# Patient Record
Sex: FEMALE | Race: NATIVE HAWAIIAN OR OTHER PACIFIC ISLANDER | Employment: UNEMPLOYED | URBAN - METROPOLITAN AREA
[De-identification: names, ages, dates, MRNs, and addresses within clinical notes are randomized per-mention and may not be internally consistent; named-entity substitution may affect disease eponyms.]

---

## 2022-08-08 LAB
AMB EXT MYRIAD FORESIGHT: NEGATIVE
AMB EXT MYRIAD TRISOMY 13: NEGATIVE
AMB EXT MYRIAD TRISOMY 18: NEGATIVE
AMB EXT MYRIAD TRISOMY 21: NEGATIVE

## 2022-12-01 NOTE — PROGRESS NOTES
8/4/22  Ultrasound  Single live IUP  BAR=860 bpm  EGA=10w6d     10/13/22  MFM Level 2    Thank you for asking me to meet with your patient in consultation and for ultrasound. I met with her on 10/13/22. As you know, Adenike Galindo is a 30YO year-old who is currently 23w11d pregnant with an Estimated Date of Delivery: 2/24/23. Adenike Galindo is seen due to a family history of cardiac defects/cardiac disease. Adenike Galindo has an obstetric history significant for no prior pregnancies. Adenike Galindo has a past medical history significant for high cholesterol in [facility] and had been on Lipitor until early pregnancy. She reports that she has had an EKG in the past, but not recently. She denies a history of HTN or diabetes outside of pregnancy. She also has a history of anxiety, but describes that it is mostly exacerbated by caffeine at this time. She has never taken medication or had a therapist.     Adenike Galindo has a past surgical history significant for breast implants in 6605 without complications. Adenike Galindo denies smoking, alcohol and recreational drugs during this pregnancy. The FOB has a brother with a VSD and a sister with a pacemaker (?unclear reason). The patient has multiple family members (including herself) with high cholesterol. She also described her Aunt with hypertrophic cardiomyopathy. This pregnancy has been significant for cell free DNA which was low risk. The ultrasound today reveals a keane fetus with size consistent with dates and an AGA pattern of growth. The fetal cardiac anatomy appeared normal, including 4 chamber, long axis, short axis, aortic arch and great vessel views. The remaining fetal anatomy also appeared normal. Additional ultrasound abnormalities commonly observed in association with fetal aneuploidy were not identified. I discussed with the patient the findings of today's ultrasound.     Vitals Recorded in This Encounter     10/13/2022  1438         Weight: 70.3 kg (155 lb)   Height: 5' 3\" (1.6 m)   BMI: 27.46 kg/m2           Findings: 1. Intrauterine pregnancy at 20w6d (Estimated Date of Delivery: 2/24/23)  2. Normal fetal anatomic assessment ultrasound  3. Low risk cell free DNA  4. Family history of congenital cardiac defects  5. Family history of cardiac disease    I discussed with the patient the normal findings of today's ultrasound. We discussed the sensitivity of serum screening and fetal anatomic assessment ultrasound. We also discussed the limitations of ultrasound for documenting both genetic and nongenetic congenital anomalies. We further discussed that there is a family history of 2nd degree relative with a congenital cardiac defect. The cardiac evaluation is normal. The risk is therefore not increased for this fetus, but a heart defect can not be ruled out with 077% certainty. The patient has a history of high cholesterol, a strong family history of the same, and a family history of cardiac disease. I would recommend an EKG and maternal echocardiogram given no recent evaluation and her family history. If normal, no further evaluation or monitoring will be required. In summary, I make the following recommendations:  1. Continued COVID precaution and vaccine counseling, TDAP, and flu vaccine  2. Maternal EKG  3. Maternal echocardiogram  4. Avoid caffeine due to associated anxiety symptoms  5. Close mood monitoring in the setting of anxiety in pregnancy    Thanks again for referring Tamie Hurley to Maternal Fetal Medicine. Please do not hesitate to contact me if I may be of further assistance of if you should have any questions. Sincerely,     Karolina Meade MD    Please note that I spent 30 minutes total time, including total time spent in counseling and coordination of care for services on the items detailed above. All or part of this dictation was completed with Dragon voice recognition software. As such, there may be uncorrected typographical errors that occur.  Please consider this information when reading or reviewing this document. The OUD checklist was reviewed with [facility] and she screened that she IS NOT at risk for opioid use disorder. Based on her screening results she was not offered  Family Support. Note to patient: The [facility] makes medical notes like these available to patients in the interest of transparency. However, be advised this is a medical document. It is intended as peer to peer communication. It is written in medical language and may contain abbreviations or verbiage that are unfamiliar. It may appear blunt or direct. Medical documents are intended to carry relevant information, facts as evident, and the clinical opinion of the practitioner.    Electronically signed by Carmelo Rene MD at 10/13/2022 2:50 PM CDT

## 2022-12-06 ENCOUNTER — INITIAL PRENATAL (OUTPATIENT)
Facility: CLINIC | Age: 32
End: 2022-12-06
Payer: COMMERCIAL

## 2022-12-06 VITALS
HEIGHT: 63 IN | BODY MASS INDEX: 28 KG/M2 | DIASTOLIC BLOOD PRESSURE: 73 MMHG | HEART RATE: 82 BPM | WEIGHT: 158 LBS | SYSTOLIC BLOOD PRESSURE: 118 MMHG

## 2022-12-06 DIAGNOSIS — Z3A.28 28 WEEKS GESTATION OF PREGNANCY: ICD-10-CM

## 2022-12-06 DIAGNOSIS — Z23 NEED FOR VACCINATION: ICD-10-CM

## 2022-12-06 DIAGNOSIS — Z34.03 ENCOUNTER FOR SUPERVISION OF NORMAL FIRST PREGNANCY IN THIRD TRIMESTER: Primary | ICD-10-CM

## 2022-12-06 LAB
BILIRUBIN: NEGATIVE
GLUCOSE (URINE DIPSTICK): NEGATIVE MG/DL
KETONES (URINE DIPSTICK): NEGATIVE MG/DL
LEUKOCYTES: NEGATIVE
MULTISTIX LOT#: NORMAL NUMERIC
NITRITE, URINE: NEGATIVE
OCCULT BLOOD: NEGATIVE
PH, URINE: 7 (ref 4.5–8)
PROTEIN (URINE DIPSTICK): NEGATIVE MG/DL
SPECIFIC GRAVITY: 1.01 (ref 1–1.03)
UROBILINOGEN,SEMI-QN: 0.2 MG/DL (ref 0–1.9)

## 2022-12-06 PROCEDURE — 3078F DIAST BP <80 MM HG: CPT | Performed by: OBSTETRICS & GYNECOLOGY

## 2022-12-06 PROCEDURE — 81002 URINALYSIS NONAUTO W/O SCOPE: CPT | Performed by: OBSTETRICS & GYNECOLOGY

## 2022-12-06 PROCEDURE — 3008F BODY MASS INDEX DOCD: CPT | Performed by: OBSTETRICS & GYNECOLOGY

## 2022-12-06 PROCEDURE — 90715 TDAP VACCINE 7 YRS/> IM: CPT | Performed by: OBSTETRICS & GYNECOLOGY

## 2022-12-06 PROCEDURE — 3074F SYST BP LT 130 MM HG: CPT | Performed by: OBSTETRICS & GYNECOLOGY

## 2022-12-06 PROCEDURE — 90471 IMMUNIZATION ADMIN: CPT | Performed by: OBSTETRICS & GYNECOLOGY

## 2022-12-06 RX ORDER — CHOLECALCIFEROL (VITAMIN D3) 25 MCG
1 TABLET,CHEWABLE ORAL DAILY
COMMUNITY

## 2022-12-06 NOTE — PROGRESS NOTES
Transfer OB 28w4d  - denies h/o HSV, blood transfusion, hepatitis  - PGF and MGM had h/o HTN, patient had normal EKG , no h/o congenital heart defect in the family  - cfDNA neg BOY per patient  - normal L2U  - TDAP today, HIV and CBC ordered

## 2022-12-08 ENCOUNTER — TELEPHONE (OUTPATIENT)
Facility: CLINIC | Age: 32
End: 2022-12-08

## 2022-12-10 ENCOUNTER — LABORATORY ENCOUNTER (OUTPATIENT)
Dept: LAB | Age: 32
End: 2022-12-10
Attending: OBSTETRICS & GYNECOLOGY
Payer: COMMERCIAL

## 2022-12-10 DIAGNOSIS — Z34.03 ENCOUNTER FOR SUPERVISION OF NORMAL FIRST PREGNANCY IN THIRD TRIMESTER: ICD-10-CM

## 2022-12-10 LAB
BASOPHILS # BLD AUTO: 0.03 X10(3) UL (ref 0–0.2)
BASOPHILS NFR BLD AUTO: 0.4 %
EOSINOPHIL # BLD AUTO: 0.05 X10(3) UL (ref 0–0.7)
EOSINOPHIL NFR BLD AUTO: 0.7 %
ERYTHROCYTE [DISTWIDTH] IN BLOOD BY AUTOMATED COUNT: 13 %
HCT VFR BLD AUTO: 39.1 %
HGB BLD-MCNC: 13 G/DL
IMM GRANULOCYTES # BLD AUTO: 0.06 X10(3) UL (ref 0–1)
IMM GRANULOCYTES NFR BLD: 0.8 %
LYMPHOCYTES # BLD AUTO: 1.92 X10(3) UL (ref 1–4)
LYMPHOCYTES NFR BLD AUTO: 25.8 %
MCH RBC QN AUTO: 31.6 PG (ref 26–34)
MCHC RBC AUTO-ENTMCNC: 33.2 G/DL (ref 31–37)
MCV RBC AUTO: 95.1 FL
MONOCYTES # BLD AUTO: 0.31 X10(3) UL (ref 0.1–1)
MONOCYTES NFR BLD AUTO: 4.2 %
NEUTROPHILS # BLD AUTO: 5.07 X10 (3) UL (ref 1.5–7.7)
NEUTROPHILS # BLD AUTO: 5.07 X10(3) UL (ref 1.5–7.7)
NEUTROPHILS NFR BLD AUTO: 68.1 %
PLATELET # BLD AUTO: 244 10(3)UL (ref 150–450)
RBC # BLD AUTO: 4.11 X10(6)UL
WBC # BLD AUTO: 7.4 X10(3) UL (ref 4–11)

## 2022-12-10 PROCEDURE — 85025 COMPLETE CBC W/AUTO DIFF WBC: CPT | Performed by: OBSTETRICS & GYNECOLOGY

## 2022-12-10 PROCEDURE — 87389 HIV-1 AG W/HIV-1&-2 AB AG IA: CPT | Performed by: OBSTETRICS & GYNECOLOGY

## 2022-12-22 ENCOUNTER — ROUTINE PRENATAL (OUTPATIENT)
Facility: CLINIC | Age: 32
End: 2022-12-22
Payer: COMMERCIAL

## 2022-12-22 VITALS
HEIGHT: 63 IN | HEART RATE: 87 BPM | WEIGHT: 162 LBS | DIASTOLIC BLOOD PRESSURE: 70 MMHG | BODY MASS INDEX: 28.7 KG/M2 | SYSTOLIC BLOOD PRESSURE: 118 MMHG

## 2022-12-22 DIAGNOSIS — Z34.83 PRENATAL CARE, SUBSEQUENT PREGNANCY, THIRD TRIMESTER: Primary | ICD-10-CM

## 2022-12-22 LAB
GLUCOSE (URINE DIPSTICK): NEGATIVE MG/DL
MULTISTIX LOT#: NORMAL NUMERIC
PROTEIN (URINE DIPSTICK): NEGATIVE MG/DL

## 2022-12-22 PROCEDURE — 81002 URINALYSIS NONAUTO W/O SCOPE: CPT | Performed by: OBSTETRICS & GYNECOLOGY

## 2022-12-22 PROCEDURE — 3008F BODY MASS INDEX DOCD: CPT | Performed by: OBSTETRICS & GYNECOLOGY

## 2022-12-22 PROCEDURE — 3078F DIAST BP <80 MM HG: CPT | Performed by: OBSTETRICS & GYNECOLOGY

## 2022-12-22 PROCEDURE — 3074F SYST BP LT 130 MM HG: CPT | Performed by: OBSTETRICS & GYNECOLOGY

## 2022-12-22 NOTE — PROGRESS NOTES
Breast pump information given. Flu shot is done. They desire circumcision. Some numbness in her hands.

## 2023-01-05 ENCOUNTER — ROUTINE PRENATAL (OUTPATIENT)
Facility: CLINIC | Age: 33
End: 2023-01-05
Payer: COMMERCIAL

## 2023-01-05 VITALS
HEIGHT: 63 IN | WEIGHT: 164.38 LBS | DIASTOLIC BLOOD PRESSURE: 68 MMHG | BODY MASS INDEX: 29.12 KG/M2 | SYSTOLIC BLOOD PRESSURE: 120 MMHG

## 2023-01-05 DIAGNOSIS — Z34.83 PRENATAL CARE, SUBSEQUENT PREGNANCY, THIRD TRIMESTER: Primary | ICD-10-CM

## 2023-01-05 PROCEDURE — 3074F SYST BP LT 130 MM HG: CPT

## 2023-01-05 PROCEDURE — 81002 URINALYSIS NONAUTO W/O SCOPE: CPT

## 2023-01-05 PROCEDURE — 3008F BODY MASS INDEX DOCD: CPT

## 2023-01-05 PROCEDURE — 3078F DIAST BP <80 MM HG: CPT

## 2023-01-05 NOTE — PROGRESS NOTES
MICHELLE 32w6d    Pt doing well, no complaints       Transfer OB 28w4d  -- PGF and MGM had h/o HTN, patient had normal EKG , no h/o congenital heart defect in the family  - cfDNA neg BOY per patient  - normal L2US  - TDAP today, HIV and CBC normal    RTC 2 wk

## 2023-01-19 PROBLEM — O35.2XX0: Status: ACTIVE | Noted: 2022-10-13

## 2023-01-19 PROBLEM — O09.90 SUPERVISION OF HIGH RISK PREGNANCY, ANTEPARTUM: Status: ACTIVE | Noted: 2022-10-20

## 2023-01-19 PROBLEM — R93.89 ABNORMAL ULTRASOUND: Status: ACTIVE | Noted: 2022-05-05

## 2023-01-19 PROBLEM — Z86.16 HISTORY OF COVID-19: Status: ACTIVE | Noted: 2023-01-19

## 2023-01-19 PROBLEM — O09.90 SUPERVISION OF HIGH RISK PREGNANCY, ANTEPARTUM (HCC): Status: ACTIVE | Noted: 2022-10-20

## 2023-01-19 PROBLEM — F41.9 ANXIETY DISORDER AFFECTING PREGNANCY, ANTEPARTUM (HCC): Status: ACTIVE | Noted: 2023-01-19

## 2023-01-19 PROBLEM — F41.9 ANXIETY DISORDER AFFECTING PREGNANCY, ANTEPARTUM: Status: ACTIVE | Noted: 2023-01-19

## 2023-01-19 PROBLEM — F41.9 ANXIETY: Status: ACTIVE | Noted: 2022-10-20

## 2023-01-19 PROBLEM — Z34.93 PREGNANCY WITH PRENATAL CARE ELSEWHERE IN THIRD TRIMESTER (HCC): Status: ACTIVE | Noted: 2023-01-19

## 2023-01-19 PROBLEM — O99.340 ANXIETY DISORDER AFFECTING PREGNANCY, ANTEPARTUM: Status: ACTIVE | Noted: 2023-01-19

## 2023-01-19 PROBLEM — Z34.83 PRENATAL CARE, SUBSEQUENT PREGNANCY IN THIRD TRIMESTER (HCC): Status: ACTIVE | Noted: 2023-01-19

## 2023-01-19 PROBLEM — D21.9 FIBROIDS: Status: ACTIVE | Noted: 2022-05-05

## 2023-01-19 PROBLEM — Z34.93 PREGNANCY WITH PRENATAL CARE ELSEWHERE IN THIRD TRIMESTER: Status: ACTIVE | Noted: 2023-01-19

## 2023-01-19 PROBLEM — Z82.79 FAMILY HISTORY OF CONGENITAL HEART DISEASE: Status: ACTIVE | Noted: 2023-01-19

## 2023-01-19 PROBLEM — Z34.83 PRENATAL CARE, SUBSEQUENT PREGNANCY IN THIRD TRIMESTER: Status: ACTIVE | Noted: 2023-01-19

## 2023-01-19 PROBLEM — O99.340 ANXIETY DISORDER AFFECTING PREGNANCY, ANTEPARTUM (HCC): Status: ACTIVE | Noted: 2023-01-19

## 2023-01-20 ENCOUNTER — ULTRASOUND ENCOUNTER (OUTPATIENT)
Facility: CLINIC | Age: 33
End: 2023-01-20
Payer: COMMERCIAL

## 2023-01-20 PROBLEM — O26.899 CARPAL TUNNEL SYNDROME DURING PREGNANCY: Status: ACTIVE | Noted: 2023-01-20

## 2023-01-20 PROBLEM — O26.899 CARPAL TUNNEL SYNDROME DURING PREGNANCY (HCC): Status: ACTIVE | Noted: 2023-01-20

## 2023-01-20 PROBLEM — G56.00 CARPAL TUNNEL SYNDROME DURING PREGNANCY (HCC): Status: ACTIVE | Noted: 2023-01-20

## 2023-01-20 PROBLEM — G56.00 CARPAL TUNNEL SYNDROME DURING PREGNANCY: Status: ACTIVE | Noted: 2023-01-20

## 2023-01-20 PROBLEM — O28.8 AFI (AMNIOTIC FLUID INDEX) BORDERLINE LOW: Status: ACTIVE | Noted: 2023-01-20

## 2023-01-23 ENCOUNTER — TELEPHONE (OUTPATIENT)
Dept: OBGYN CLINIC | Facility: CLINIC | Age: 33
End: 2023-01-23

## 2023-01-23 DIAGNOSIS — O28.8 AFI (AMNIOTIC FLUID INDEX) BORDERLINE LOW: Primary | ICD-10-CM

## 2023-01-26 ENCOUNTER — ULTRASOUND ENCOUNTER (OUTPATIENT)
Facility: CLINIC | Age: 33
End: 2023-01-26
Payer: COMMERCIAL

## 2023-01-27 ENCOUNTER — TELEPHONE (OUTPATIENT)
Facility: CLINIC | Age: 33
End: 2023-01-27

## 2023-01-27 NOTE — TELEPHONE ENCOUNTER
Breast Pump order was received from [employer] [facility]. Order form was faxed in with doctors signature.

## 2023-02-02 ENCOUNTER — ROUTINE PRENATAL (OUTPATIENT)
Facility: CLINIC | Age: 33
End: 2023-02-02
Payer: COMMERCIAL

## 2023-02-02 VITALS
HEIGHT: 63 IN | DIASTOLIC BLOOD PRESSURE: 70 MMHG | HEART RATE: 104 BPM | SYSTOLIC BLOOD PRESSURE: 126 MMHG | BODY MASS INDEX: 29.42 KG/M2 | WEIGHT: 166.06 LBS

## 2023-02-02 DIAGNOSIS — Z3A.36 36 WEEKS GESTATION OF PREGNANCY: ICD-10-CM

## 2023-02-02 DIAGNOSIS — N89.8 VAGINAL ITCHING: ICD-10-CM

## 2023-02-02 DIAGNOSIS — Z34.83 PRENATAL CARE, SUBSEQUENT PREGNANCY IN THIRD TRIMESTER: Primary | ICD-10-CM

## 2023-02-02 PROCEDURE — 87510 GARDNER VAG DNA DIR PROBE: CPT

## 2023-02-02 PROCEDURE — 87480 CANDIDA DNA DIR PROBE: CPT

## 2023-02-02 PROCEDURE — 87660 TRICHOMONAS VAGIN DIR PROBE: CPT

## 2023-02-02 PROCEDURE — 87653 STREP B DNA AMP PROBE: CPT

## 2023-02-02 PROCEDURE — 87081 CULTURE SCREEN ONLY: CPT

## 2023-02-04 LAB — GROUP B STREP BY PCR FOR PCR OVT: NEGATIVE

## 2023-02-09 ENCOUNTER — ROUTINE PRENATAL (OUTPATIENT)
Facility: CLINIC | Age: 33
End: 2023-02-09
Payer: COMMERCIAL

## 2023-02-09 VITALS
WEIGHT: 170 LBS | SYSTOLIC BLOOD PRESSURE: 120 MMHG | BODY MASS INDEX: 30.12 KG/M2 | HEART RATE: 77 BPM | HEIGHT: 63 IN | DIASTOLIC BLOOD PRESSURE: 70 MMHG

## 2023-02-09 DIAGNOSIS — O28.8 AFI (AMNIOTIC FLUID INDEX) BORDERLINE LOW: ICD-10-CM

## 2023-02-09 DIAGNOSIS — Z34.83 PRENATAL CARE, SUBSEQUENT PREGNANCY, THIRD TRIMESTER: Primary | ICD-10-CM

## 2023-02-09 LAB
GLUCOSE (URINE DIPSTICK): NEGATIVE MG/DL
MULTISTIX EXPIRATION DATE: NORMAL DATE
MULTISTIX LOT#: NORMAL NUMERIC
PROTEIN (URINE DIPSTICK): NEGATIVE MG/DL

## 2023-02-09 PROCEDURE — 59025 FETAL NON-STRESS TEST: CPT

## 2023-02-09 PROCEDURE — 3008F BODY MASS INDEX DOCD: CPT

## 2023-02-09 PROCEDURE — 3074F SYST BP LT 130 MM HG: CPT

## 2023-02-09 PROCEDURE — 81002 URINALYSIS NONAUTO W/O SCOPE: CPT

## 2023-02-09 PROCEDURE — 3078F DIAST BP <80 MM HG: CPT

## 2023-02-16 ENCOUNTER — ROUTINE PRENATAL (OUTPATIENT)
Facility: CLINIC | Age: 33
End: 2023-02-16
Payer: COMMERCIAL

## 2023-02-16 VITALS
WEIGHT: 170.63 LBS | HEIGHT: 63 IN | HEART RATE: 78 BPM | DIASTOLIC BLOOD PRESSURE: 70 MMHG | SYSTOLIC BLOOD PRESSURE: 122 MMHG | BODY MASS INDEX: 30.23 KG/M2

## 2023-02-16 DIAGNOSIS — Z34.93 ENCOUNTER FOR SUPERVISION OF NORMAL PREGNANCY IN THIRD TRIMESTER, UNSPECIFIED GRAVIDITY: Primary | ICD-10-CM

## 2023-02-16 DIAGNOSIS — Z3A.38 38 WEEKS GESTATION OF PREGNANCY: ICD-10-CM

## 2023-02-16 DIAGNOSIS — O28.8 AFI (AMNIOTIC FLUID INDEX) BORDERLINE LOW: ICD-10-CM

## 2023-02-16 PROCEDURE — 3078F DIAST BP <80 MM HG: CPT | Performed by: OBSTETRICS & GYNECOLOGY

## 2023-02-16 PROCEDURE — 3074F SYST BP LT 130 MM HG: CPT | Performed by: OBSTETRICS & GYNECOLOGY

## 2023-02-16 PROCEDURE — 81002 URINALYSIS NONAUTO W/O SCOPE: CPT | Performed by: OBSTETRICS & GYNECOLOGY

## 2023-02-16 PROCEDURE — 3008F BODY MASS INDEX DOCD: CPT | Performed by: OBSTETRICS & GYNECOLOGY

## 2023-02-16 PROCEDURE — 59426 ANTEPARTUM CARE ONLY: CPT | Performed by: OBSTETRICS & GYNECOLOGY

## 2023-02-17 ENCOUNTER — HOSPITAL ENCOUNTER (INPATIENT)
Facility: HOSPITAL | Age: 33
LOS: 3 days | Discharge: HOME | End: 2023-02-20
Attending: OBSTETRICS & GYNECOLOGY | Admitting: OBSTETRICS & GYNECOLOGY
Payer: COMMERCIAL

## 2023-02-17 ENCOUNTER — APPOINTMENT (OUTPATIENT)
Dept: OBGYN CLINIC | Facility: HOSPITAL | Age: 33
End: 2023-02-17
Payer: COMMERCIAL

## 2023-02-17 ENCOUNTER — ULTRASOUND ENCOUNTER (OUTPATIENT)
Facility: CLINIC | Age: 33
End: 2023-02-17
Payer: COMMERCIAL

## 2023-02-17 PROBLEM — Z34.90 PREGNANCY (HCC): Status: ACTIVE | Noted: 2023-02-17

## 2023-02-17 PROBLEM — Z34.90 PREGNANCY: Status: ACTIVE | Noted: 2023-02-17

## 2023-02-17 LAB
ANTIBODY SCREEN: NEGATIVE
BASOPHILS # BLD AUTO: 0.02 X10(3) UL (ref 0–0.2)
BASOPHILS NFR BLD AUTO: 0.3 %
EOSINOPHIL # BLD AUTO: 0.03 X10(3) UL (ref 0–0.7)
EOSINOPHIL NFR BLD AUTO: 0.4 %
ERYTHROCYTE [DISTWIDTH] IN BLOOD BY AUTOMATED COUNT: 12.1 %
HCT VFR BLD AUTO: 38 %
HGB BLD-MCNC: 12.9 G/DL
IMM GRANULOCYTES # BLD AUTO: 0.02 X10(3) UL (ref 0–1)
IMM GRANULOCYTES NFR BLD: 0.3 %
LYMPHOCYTES # BLD AUTO: 2.09 X10(3) UL (ref 1–4)
LYMPHOCYTES NFR BLD AUTO: 26.7 %
MCH RBC QN AUTO: 31.6 PG (ref 26–34)
MCHC RBC AUTO-ENTMCNC: 33.9 G/DL (ref 31–37)
MCV RBC AUTO: 93.1 FL
MONOCYTES # BLD AUTO: 0.3 X10(3) UL (ref 0.1–1)
MONOCYTES NFR BLD AUTO: 3.8 %
NEUTROPHILS # BLD AUTO: 5.38 X10 (3) UL (ref 1.5–7.7)
NEUTROPHILS # BLD AUTO: 5.38 X10(3) UL (ref 1.5–7.7)
NEUTROPHILS NFR BLD AUTO: 68.5 %
PLATELET # BLD AUTO: 212 10(3)UL (ref 150–450)
RBC # BLD AUTO: 4.08 X10(6)UL
RH BLOOD TYPE: POSITIVE
SARS-COV-2 RNA RESP QL NAA+PROBE: NOT DETECTED
T PALLIDUM AB SER QL IA: NONREACTIVE
WBC # BLD AUTO: 7.8 X10(3) UL (ref 4–11)

## 2023-02-17 PROCEDURE — 3E0P7VZ INTRODUCTION OF HORMONE INTO FEMALE REPRODUCTIVE, VIA NATURAL OR ARTIFICIAL OPENING: ICD-10-PCS | Performed by: OBSTETRICS & GYNECOLOGY

## 2023-02-17 RX ORDER — DEXTROSE, SODIUM CHLORIDE, SODIUM LACTATE, POTASSIUM CHLORIDE, AND CALCIUM CHLORIDE 5; .6; .31; .03; .02 G/100ML; G/100ML; G/100ML; G/100ML; G/100ML
INJECTION, SOLUTION INTRAVENOUS AS NEEDED
Status: DISCONTINUED | OUTPATIENT
Start: 2023-02-17 | End: 2023-02-18 | Stop reason: HOSPADM

## 2023-02-17 RX ORDER — SODIUM CHLORIDE, SODIUM LACTATE, POTASSIUM CHLORIDE, CALCIUM CHLORIDE 600; 310; 30; 20 MG/100ML; MG/100ML; MG/100ML; MG/100ML
INJECTION, SOLUTION INTRAVENOUS CONTINUOUS
Status: DISCONTINUED | OUTPATIENT
Start: 2023-02-17 | End: 2023-02-18 | Stop reason: HOSPADM

## 2023-02-17 RX ORDER — NALBUPHINE HCL 10 MG/ML
2.5 AMPUL (ML) INJECTION
Status: DISCONTINUED | OUTPATIENT
Start: 2023-02-17 | End: 2023-02-18

## 2023-02-17 RX ORDER — TERBUTALINE SULFATE 1 MG/ML
0.25 INJECTION, SOLUTION SUBCUTANEOUS AS NEEDED
Status: DISCONTINUED | OUTPATIENT
Start: 2023-02-17 | End: 2023-02-18 | Stop reason: HOSPADM

## 2023-02-17 RX ORDER — HYDROMORPHONE HYDROCHLORIDE 1 MG/ML
1 INJECTION, SOLUTION INTRAMUSCULAR; INTRAVENOUS; SUBCUTANEOUS ONCE
Status: COMPLETED | OUTPATIENT
Start: 2023-02-17 | End: 2023-02-18

## 2023-02-17 RX ORDER — TRISODIUM CITRATE DIHYDRATE AND CITRIC ACID MONOHYDRATE 500; 334 MG/5ML; MG/5ML
30 SOLUTION ORAL AS NEEDED
Status: DISCONTINUED | OUTPATIENT
Start: 2023-02-17 | End: 2023-02-18 | Stop reason: HOSPADM

## 2023-02-17 RX ORDER — ACETAMINOPHEN 500 MG
500 TABLET ORAL EVERY 6 HOURS PRN
Status: DISCONTINUED | OUTPATIENT
Start: 2023-02-17 | End: 2023-02-18

## 2023-02-17 RX ORDER — BUPIVACAINE HCL/0.9 % NACL/PF 0.25 %
5 PLASTIC BAG, INJECTION (ML) EPIDURAL AS NEEDED
Status: DISCONTINUED | OUTPATIENT
Start: 2023-02-17 | End: 2023-02-18

## 2023-02-17 RX ORDER — AMMONIA INHALANTS 0.04 G/.3ML
0.3 INHALANT RESPIRATORY (INHALATION) AS NEEDED
Status: DISCONTINUED | OUTPATIENT
Start: 2023-02-17 | End: 2023-02-18 | Stop reason: HOSPADM

## 2023-02-17 RX ORDER — IBUPROFEN 600 MG/1
600 TABLET ORAL EVERY 6 HOURS PRN
Status: DISCONTINUED | OUTPATIENT
Start: 2023-02-17 | End: 2023-02-18 | Stop reason: HOSPADM

## 2023-02-17 RX ORDER — ONDANSETRON 2 MG/ML
4 INJECTION INTRAMUSCULAR; INTRAVENOUS EVERY 6 HOURS PRN
Status: DISCONTINUED | OUTPATIENT
Start: 2023-02-17 | End: 2023-02-18 | Stop reason: HOSPADM

## 2023-02-18 ENCOUNTER — ANESTHESIA EVENT (OUTPATIENT)
Dept: OBGYN UNIT | Facility: HOSPITAL | Age: 33
End: 2023-02-18
Payer: COMMERCIAL

## 2023-02-18 ENCOUNTER — ANESTHESIA (OUTPATIENT)
Dept: OBGYN UNIT | Facility: HOSPITAL | Age: 33
End: 2023-02-18
Payer: COMMERCIAL

## 2023-02-18 PROCEDURE — 3E033VJ INTRODUCTION OF OTHER HORMONE INTO PERIPHERAL VEIN, PERCUTANEOUS APPROACH: ICD-10-PCS | Performed by: OBSTETRICS & GYNECOLOGY

## 2023-02-18 PROCEDURE — 59410 OBSTETRICAL CARE: CPT | Performed by: OBSTETRICS & GYNECOLOGY

## 2023-02-18 PROCEDURE — 0KQM0ZZ REPAIR PERINEUM MUSCLE, OPEN APPROACH: ICD-10-PCS | Performed by: OBSTETRICS & GYNECOLOGY

## 2023-02-18 RX ORDER — IBUPROFEN 600 MG/1
600 TABLET ORAL EVERY 6 HOURS
Status: DISCONTINUED | OUTPATIENT
Start: 2023-02-18 | End: 2023-02-20

## 2023-02-18 RX ORDER — LIDOCAINE HYDROCHLORIDE AND EPINEPHRINE 15; 5 MG/ML; UG/ML
INJECTION, SOLUTION EPIDURAL AS NEEDED
Status: DISCONTINUED | OUTPATIENT
Start: 2023-02-18 | End: 2023-02-18 | Stop reason: SURG

## 2023-02-18 RX ORDER — SIMETHICONE 80 MG
80 TABLET,CHEWABLE ORAL 3 TIMES DAILY PRN
Status: DISCONTINUED | OUTPATIENT
Start: 2023-02-18 | End: 2023-02-20

## 2023-02-18 RX ORDER — DOCUSATE SODIUM 100 MG/1
100 CAPSULE, LIQUID FILLED ORAL
Status: DISCONTINUED | OUTPATIENT
Start: 2023-02-18 | End: 2023-02-20

## 2023-02-18 RX ORDER — ACETAMINOPHEN 500 MG
1000 TABLET ORAL EVERY 6 HOURS PRN
Status: DISCONTINUED | OUTPATIENT
Start: 2023-02-18 | End: 2023-02-20

## 2023-02-18 RX ORDER — ACETAMINOPHEN 500 MG
500 TABLET ORAL EVERY 6 HOURS PRN
Status: DISCONTINUED | OUTPATIENT
Start: 2023-02-18 | End: 2023-02-20

## 2023-02-18 RX ORDER — BISACODYL 10 MG
10 SUPPOSITORY, RECTAL RECTAL ONCE AS NEEDED
Status: DISCONTINUED | OUTPATIENT
Start: 2023-02-18 | End: 2023-02-20

## 2023-02-18 RX ADMIN — LIDOCAINE HYDROCHLORIDE AND EPINEPHRINE 5 ML: 15; 5 INJECTION, SOLUTION EPIDURAL at 04:31:00

## 2023-02-18 NOTE — ANESTHESIA PROCEDURE NOTES
Labor Analgesia    Date/Time: 2/18/2023 4:20 AM    Performed by: João Cano MD  Authorized by: João Cano MD      General Information and Staff    Start Time:  2/18/2023 4:20 AM  End Time:  2/18/2023 4:31 AM  Anesthesiologist:  João Cano MD  Performed by:   Anesthesiologist  Patient Location:  OB  Site Identification: surface landmarks    Reason for Block: labor epidural    Preanesthetic Checklist: patient identified, IV checked, risks and benefits discussed, monitors and equipment checked, pre-op evaluation, timeout performed, IV bolus, anesthesia consent and sterile technique used      Procedure Details    Patient Position:  Sitting  Prep: ChloraPrep    Monitoring:  Heart rate and continuous pulse ox  Approach:  Midline    Epidural Needle    Injection Technique:  THUAN air  Needle Type:  Tuohy  Needle Gauge:  17 G  Needle Length:  3.375 in  Needle Insertion Depth:  6  Location:  L3-4    Spinal Needle      Catheter    Catheter Type:  End hole  Catheter Size:  19 G  Catheter at Skin Depth:  10  Test Dose:  Negative    Assessment  Sensory Level:  T10    Additional Comments

## 2023-02-18 NOTE — PLAN OF CARE
Problem: BIRTH - VAGINAL/ SECTION  Goal: Fetal and maternal status remain reassuring during the birth process  Description: INTERVENTIONS:  - Monitor vital signs  - Monitor fetal heart rate  - Monitor uterine activity  - Monitor labor progression (vaginal delivery)  - DVT prophylaxis (C/S delivery)  - Surgical antibiotic prophylaxis (C/S delivery)  Outcome: Progressing     Problem: PAIN - ADULT  Goal: Verbalizes/displays adequate comfort level or patient's stated pain goal  Description: INTERVENTIONS:  - Encourage pt to monitor pain and request assistance  - Assess pain using appropriate pain scale  - Administer analgesics based on type and severity of pain and evaluate response  - Implement non-pharmacological measures as appropriate and evaluate response  - Consider cultural and social influences on pain and pain management  - Manage/alleviate anxiety  - Utilize distraction and/or relaxation techniques  - Monitor for opioid side effects  - Notify MD/LIP if interventions unsuccessful or patient reports new pain  - Anticipate increased pain with activity and pre-medicate as appropriate  Outcome: Progressing     Problem: ANXIETY  Goal: Will report anxiety at manageable levels  Description: INTERVENTIONS:  - Administer medication as ordered  - Teach and rehearse alternative coping skills  - Provide emotional support with 1:1 interaction with staff  Outcome: Progressing     Problem: Patient/Family Goals  Goal: Patient/Family Long Term Goal  Description: Patient's Long Term Goal: safe delivery for mom and baby    Interventions:  - See additional Care Plan goals for specific interventions  Outcome: Progressing  Goal: Patient/Family Short Term Goal  Description: Patient's Short Term Goal: effective pain management    Interventions:   - See additional Care Plan goals for specific interventions  Outcome: Progressing

## 2023-02-18 NOTE — PROGRESS NOTES
Patient up to bathroom with assist x 2. Unable to void at this time. Patient transferred to mother/baby room 1113 per wheelchair in stable condition with baby and personal belongings. Accompanied by significant other and staff. Report given to mother/baby RN. Pharmacy Note     Meropenem 1 g q 8 h  ordered for treatment of intra-abdominal infx. Per [facility] Policy Renal / Extended Infusion B Lactam    , meropenem will be changed to 1 g x 1 then 500 mg q 12 h extended infusion protocol . Estimated Creatinine Clearance: Estimated Creatinine Clearance: 16.3 mL/min (A) (based on SCr of 2.7 mg/dL (H)). Dialysis Status, MISTY, CKD: Y    BMI:  Body mass index is 22.14 kg/m². Rationale for Adjustment:  renal fx, protocol. Pharmacy will continue to monitor and adjust dose as necessary. Please call with any questions.     Thank you,  Donna Lazaro, PHARMD

## 2023-02-18 NOTE — PLAN OF CARE
Problem: BIRTH - VAGINAL/ SECTION  Goal: Fetal and maternal status remain reassuring during the birth process  Description: INTERVENTIONS:  - Monitor vital signs  - Monitor fetal heart rate  - Monitor uterine activity  - Monitor labor progression (vaginal delivery)  - DVT prophylaxis (C/S delivery)  - Surgical antibiotic prophylaxis (C/S delivery)  Outcome: Progressing     Problem: PAIN - ADULT  Goal: Verbalizes/displays adequate comfort level or patient's stated pain goal  Description: INTERVENTIONS:  - Encourage pt to monitor pain and request assistance  - Assess pain using appropriate pain scale  - Administer analgesics based on type and severity of pain and evaluate response  - Implement non-pharmacological measures as appropriate and evaluate response  - Consider cultural and social influences on pain and pain management  - Manage/alleviate anxiety  - Utilize distraction and/or relaxation techniques  - Monitor for opioid side effects  - Notify MD/LIP if interventions unsuccessful or patient reports new pain  - Anticipate increased pain with activity and pre-medicate as appropriate  Outcome: Progressing     Problem: ANXIETY  Goal: Will report anxiety at manageable levels  Description: INTERVENTIONS:  - Administer medication as ordered  - Teach and rehearse alternative coping skills  - Provide emotional support with 1:1 interaction with staff  Outcome: Progressing     Problem: Patient/Family Goals  Goal: Patient/Family Long Term Goal  Description: Patient's Long Term Goal: safe delivery for mom and baby    Interventions:  -   - See additional Care Plan goals for specific interventions  Outcome: Progressing  Goal: Patient/Family Short Term Goal  Description: Patient's Short Term Goal: effective pain management    Interventions:   -  - See additional Care Plan goals for specific interventions  Outcome: Progressing

## 2023-02-18 NOTE — L&D DELIVERY NOTE
[place], [place] [[id number]]    Labor Events     labor?: No   steroids?: None  Cervical ripening type: Misoprostol  Rupture date/time: 2023 0400     Rupture type: SROM  Fluid color: Clear  Induction: Misoprostol  Indications for induction: Oligohydramnios  Augmentation: None     Labor Event Times    Labor onset date/time: 2023 0030  Dilation complete date/time: 2023 0500  Start pushing date/time: 2023 0655     [place] Presentation    Presentation: Vertex     Operative Delivery    Operative Vaginal Delivery: No            Shoulder Dystocia    Shoulder Dystocia: No     Anesthesia    Method: Epidural          [place] Delivery    Head delivery date/time: 2023 07:46:55   Delivery date/time:  23 07:46:58   Delivery type: Normal spontaneous vaginal delivery    Details:     Delivery location: delivery room     Delivery Providers    Delivering Clinician: Yadira Merchant DO   Delivery personnel:  Provider Role   Hallie Luong, RN Baby Nurse   Chepe Cason, RN Delivery Nurse         Cord    Vessels: 3 Vessels  Complications: None  Timed cord clamping: Yes  Time in sec: 30  Cord blood disposition: to lab  Gases sent?: No     Resuscitation    Method: None      Measurements    Weight: 3230 g 7 lb 1.9 oz Length: 50.8 cm   Head circum.: 33 cm Chest circum.: 31.5 cm      Abdominal circum.: 30.5 cm       Placenta    Date/time: 2023 0749  Removal: Spontaneous  Appearance: Intact  Disposition: held for future pathology     Apgars    Living status: Living   Apgar Scoring Key:    0 1 2    Skin color Blue or pale Acrocyanotic Completely pink    Heart rate Absent <100 bpm >100 bpm    Reflex irritability No response Grimace Cry or active withdrawal    Muscle tone Limp Some flexion Active motion    Respiratory effort Absent Weak cry; hypoventilation Good, crying              1 Minute:  5 Minute:  10 Minute:  15 Minute:  20 Minute:    Skin color:        Heart rate:        Reflex irritablity:        Muscle tone:        Respiratory effort: Total:           Apgars assigned by: LV CHINCHILLA RN   disposition: with mother     Skin to Skin    Skin to skin with: Mother     Vaginal Count     Sponges   Sharps    Initial counts        Final counts 11   1    Final count RN: Davis Jc RN  Final count MD: Denys Jay, DO     Delivery (Maternal)    Episiotomy: None  Perineal lacerations: 2nd Repaired?: Yes   Vaginal laceration?: No    Cervical laceration?: No    Clitoral laceration?: No    Quantitative blood loss (mL): 209          28 y.o.  at 36w3d with  male infant, APGARS 9/9, weight 7lb2oz. Body and shoulders easily delivered, cord clamped x2 and cut after 30 seconds. Placenta delivered spontaneously, intact. 2nd degree perineal laceration repaired with 2-0 chromic.  Good hemostasis,

## 2023-02-18 NOTE — PLAN OF CARE
Problem: BIRTH - VAGINAL/ SECTION  Goal: Fetal and maternal status remain reassuring during the birth process  Description: INTERVENTIONS:  - Monitor vital signs  - Monitor fetal heart rate  - Monitor uterine activity  - Monitor labor progression (vaginal delivery)  - DVT prophylaxis (C/S delivery)  - Surgical antibiotic prophylaxis (C/S delivery)  2023 by Gertrude Almanza RN  Outcome: Completed  2023 by Gertrude Almanza RN  Outcome: Progressing     Problem: PAIN - ADULT  Goal: Verbalizes/displays adequate comfort level or patient's stated pain goal  Description: INTERVENTIONS:  - Encourage pt to monitor pain and request assistance  - Assess pain using appropriate pain scale  - Administer analgesics based on type and severity of pain and evaluate response  - Implement non-pharmacological measures as appropriate and evaluate response  - Consider cultural and social influences on pain and pain management  - Manage/alleviate anxiety  - Utilize distraction and/or relaxation techniques  - Monitor for opioid side effects  - Notify MD/LIP if interventions unsuccessful or patient reports new pain  - Anticipate increased pain with activity and pre-medicate as appropriate  2023 by Gertrude Almanza RN  Outcome: Completed  2023 by Gertrude Almanza RN  Outcome: Progressing     Problem: ANXIETY  Goal: Will report anxiety at manageable levels  Description: INTERVENTIONS:  - Administer medication as ordered  - Teach and rehearse alternative coping skills  - Provide emotional support with 1:1 interaction with staff  2023 by Gertrude Almanza RN  Outcome: Completed  2023 by Gertrude Almanza RN  Outcome: Progressing     Problem: Patient/Family Goals  Goal: Patient/Family Long Term Goal  Description: Patient's Long Term Goal: safe delivery for mom and baby    Interventions:  -   - See additional Care Plan goals for specific interventions  2023 by Nora Esparza RN  Outcome: Completed  2/18/2023 0724 by Nora Esparza RN  Outcome: Progressing  Goal: Patient/Family Short Term Goal  Description: Patient's Short Term Goal: effective pain management    Interventions:   -  - See additional Care Plan goals for specific interventions  2/18/2023 0829 by Nora Esparza RN  Outcome: Completed  2/18/2023 0724 by Nora Esparza RN  Outcome: Progressing     Problem: SAFETY ADULT - FALL  Goal: Free from fall injury  Description: INTERVENTIONS:  - Assess pt frequently for physical needs  - Identify cognitive and physical deficits and behaviors that affect risk of falls.   - [place] fall precautions as indicated by assessment.  - Educate pt/family on patient safety including physical limitations  - Instruct pt to call for assistance with activity based on assessment  - Modify environment to reduce risk of injury  - Provide assistive devices as appropriate  - Consider OT/PT consult to assist with strengthening/mobility  - Encourage toileting schedule  Outcome: Progressing

## 2023-02-18 NOTE — PLAN OF CARE
Problem: SAFETY ADULT - FALL  Goal: Free from fall injury  Description: INTERVENTIONS:  - Assess pt frequently for physical needs  - Identify cognitive and physical deficits and behaviors that affect risk of falls. - [place] fall precautions as indicated by assessment.  - Educate pt/family on patient safety including physical limitations  - Instruct pt to call for assistance with activity based on assessment  - Modify environment to reduce risk of injury  - Provide assistive devices as appropriate  - Consider OT/PT consult to assist with strengthening/mobility  - Encourage toileting schedule  Outcome: Progressing     Problem: POSTPARTUM  Goal: Long Term Goal:Experiences normal postpartum course  Description: INTERVENTIONS:  - Assess and monitor vital signs and lab values. - Assess fundus and lochia. - Provide ice/sitz baths for perineum discomfort. - Monitor healing of incision/episiotomy/laceration, and assess for signs and symptoms of infection and hematoma. - Assess bladder function and monitor for bladder distention.  - Provide/instruct/assist with pericare as needed. - Provide VTE prophylaxis as needed. - Monitor bowel function.  - Encourage ambulation and provide assistance as needed. - Assess and monitor emotional status and provide social service/psych resources as needed. - Utilize standard precautions and use personal protective equipment as indicated. Ensure aseptic care of all intravenous lines and invasive tubes/drains.  - Obtain immunization and exposure to communicable diseases history. Outcome: Progressing  Goal: Optimize infant feeding at the breast  Description: INTERVENTIONS:  - Initiate breast feeding within first hour after birth. - Monitor effectiveness of current breast feeding efforts. - Assess support systems available to mother/family.  - Identify cultural beliefs/practices regarding lactation, letdown techniques, maternal food preferences.   - Assess mother's knowledge and previous experience with breast feeding.  - Provide information as needed about early infant feeding cues (e.g., rooting, lip smacking, sucking fingers/hand) versus late cue of crying.  - Discuss/demonstrate breast feeding aids (e.g., infant sling, nursing footstool/pillows, and breast pumps). - Encourage mother/other family members to express feelings/concerns, and actively listen. - Educate father/SO about benefits of breast feeding and how to manage common lactation challenges. - Recommend avoidance of specific medications or substances incompatible with breast feeding.  - Assess and monitor for signs of nipple pain/trauma. - Instruct and provide assistance with proper latch. - Review techniques for milk expression (breast pumping) and storage of breast milk. Provide pumping equipment/supplies, instructions and assistance, as needed. - Encourage rooming-in and breast feeding on demand.  - Encourage skin-to-skin contact. - Provide LC support as needed. - Assess for and manage engorgement. - Provide breast feeding education handouts and information on community breast feeding support. Outcome: Progressing  Goal: Appropriate maternal -  bonding  Description: INTERVENTIONS:  - Assess caregiver- interactions. - Assess caregiver's emotional status and coping mechanisms. - Encourage caregiver to participate in  daily care. - Assess support systems available to mother/family.  - Provide /case management support as needed.   Outcome: Progressing

## 2023-02-19 LAB
BASOPHILS # BLD AUTO: 0.04 X10(3) UL (ref 0–0.2)
BASOPHILS NFR BLD AUTO: 0.5 %
EOSINOPHIL # BLD AUTO: 0.05 X10(3) UL (ref 0–0.7)
EOSINOPHIL NFR BLD AUTO: 0.6 %
ERYTHROCYTE [DISTWIDTH] IN BLOOD BY AUTOMATED COUNT: 12.5 %
HCT VFR BLD AUTO: 35.4 %
HGB BLD-MCNC: 11.6 G/DL
IMM GRANULOCYTES # BLD AUTO: 0.03 X10(3) UL (ref 0–1)
IMM GRANULOCYTES NFR BLD: 0.4 %
LYMPHOCYTES # BLD AUTO: 2.93 X10(3) UL (ref 1–4)
LYMPHOCYTES NFR BLD AUTO: 35 %
MCH RBC QN AUTO: 31.5 PG (ref 26–34)
MCHC RBC AUTO-ENTMCNC: 32.8 G/DL (ref 31–37)
MCV RBC AUTO: 96.2 FL
MONOCYTES # BLD AUTO: 0.27 X10(3) UL (ref 0.1–1)
MONOCYTES NFR BLD AUTO: 3.2 %
NEUTROPHILS # BLD AUTO: 5.06 X10 (3) UL (ref 1.5–7.7)
NEUTROPHILS # BLD AUTO: 5.06 X10(3) UL (ref 1.5–7.7)
NEUTROPHILS NFR BLD AUTO: 60.3 %
PLATELET # BLD AUTO: 191 10(3)UL (ref 150–450)
RBC # BLD AUTO: 3.68 X10(6)UL
WBC # BLD AUTO: 8.4 X10(3) UL (ref 4–11)

## 2023-02-19 NOTE — PLAN OF CARE
Problem: SAFETY ADULT - FALL  Goal: Free from fall injury  Description: INTERVENTIONS:  - Assess pt frequently for physical needs  - Identify cognitive and physical deficits and behaviors that affect risk of falls. - [place] fall precautions as indicated by assessment.  - Educate pt/family on patient safety including physical limitations  - Instruct pt to call for assistance with activity based on assessment  - Modify environment to reduce risk of injury  - Provide assistive devices as appropriate  - Consider OT/PT consult to assist with strengthening/mobility  - Encourage toileting schedule  Outcome: Progressing     Problem: POSTPARTUM  Goal: Long Term Goal:Experiences normal postpartum course  Description: INTERVENTIONS:  - Assess and monitor vital signs and lab values. - Assess fundus and lochia. - Provide ice/sitz baths for perineum discomfort. - Monitor healing of incision/episiotomy/laceration, and assess for signs and symptoms of infection and hematoma. - Assess bladder function and monitor for bladder distention.  - Provide/instruct/assist with pericare as needed. - Provide VTE prophylaxis as needed. - Monitor bowel function.  - Encourage ambulation and provide assistance as needed. - Assess and monitor emotional status and provide social service/psych resources as needed. - Utilize standard precautions and use personal protective equipment as indicated. Ensure aseptic care of all intravenous lines and invasive tubes/drains.  - Obtain immunization and exposure to communicable diseases history. Outcome: Progressing  Goal: Optimize infant feeding at the breast  Description: INTERVENTIONS:  - Initiate breast feeding within first hour after birth. - Monitor effectiveness of current breast feeding efforts. - Assess support systems available to mother/family.  - Identify cultural beliefs/practices regarding lactation, letdown techniques, maternal food preferences.   - Assess mother's knowledge and previous experience with breast feeding.  - Provide information as needed about early infant feeding cues (e.g., rooting, lip smacking, sucking fingers/hand) versus late cue of crying.  - Discuss/demonstrate breast feeding aids (e.g., infant sling, nursing footstool/pillows, and breast pumps). - Encourage mother/other family members to express feelings/concerns, and actively listen. - Educate father/SO about benefits of breast feeding and how to manage common lactation challenges. - Recommend avoidance of specific medications or substances incompatible with breast feeding.  - Assess and monitor for signs of nipple pain/trauma. - Instruct and provide assistance with proper latch. - Review techniques for milk expression (breast pumping) and storage of breast milk. Provide pumping equipment/supplies, instructions and assistance, as needed. - Encourage rooming-in and breast feeding on demand.  - Encourage skin-to-skin contact. - Provide LC support as needed. - Assess for and manage engorgement. - Provide breast feeding education handouts and information on community breast feeding support. Outcome: Progressing  Goal: Appropriate maternal -  bonding  Description: INTERVENTIONS:  - Assess caregiver- interactions. - Assess caregiver's emotional status and coping mechanisms. - Encourage caregiver to participate in  daily care. - Assess support systems available to mother/family.  - Provide /case management support as needed.   Outcome: Progressing

## 2023-02-20 VITALS
SYSTOLIC BLOOD PRESSURE: 114 MMHG | TEMPERATURE: 98 F | RESPIRATION RATE: 14 BRPM | WEIGHT: 170 LBS | BODY MASS INDEX: 30.12 KG/M2 | OXYGEN SATURATION: 98 % | HEIGHT: 63 IN | DIASTOLIC BLOOD PRESSURE: 76 MMHG | HEART RATE: 71 BPM

## 2023-02-20 NOTE — PLAN OF CARE
Problem: SAFETY ADULT - FALL  Goal: Free from fall injury  Description: INTERVENTIONS:  - Assess pt frequently for physical needs  - Identify cognitive and physical deficits and behaviors that affect risk of falls. - [place] fall precautions as indicated by assessment.  - Educate pt/family on patient safety including physical limitations  - Instruct pt to call for assistance with activity based on assessment  - Modify environment to reduce risk of injury  - Provide assistive devices as appropriate  - Consider OT/PT consult to assist with strengthening/mobility  - Encourage toileting schedule  Outcome: Completed     Problem: POSTPARTUM  Goal: Long Term Goal:Experiences normal postpartum course  Description: INTERVENTIONS:  - Assess and monitor vital signs and lab values. - Assess fundus and lochia. - Provide ice/sitz baths for perineum discomfort. - Monitor healing of incision/episiotomy/laceration, and assess for signs and symptoms of infection and hematoma. - Assess bladder function and monitor for bladder distention.  - Provide/instruct/assist with pericare as needed. - Provide VTE prophylaxis as needed. - Monitor bowel function.  - Encourage ambulation and provide assistance as needed. - Assess and monitor emotional status and provide social service/psych resources as needed. - Utilize standard precautions and use personal protective equipment as indicated. Ensure aseptic care of all intravenous lines and invasive tubes/drains.  - Obtain immunization and exposure to communicable diseases history. Outcome: Completed  Goal: Optimize infant feeding at the breast  Description: INTERVENTIONS:  - Initiate breast feeding within first hour after birth. - Monitor effectiveness of current breast feeding efforts. - Assess support systems available to mother/family.  - Identify cultural beliefs/practices regarding lactation, letdown techniques, maternal food preferences.   - Assess mother's knowledge and previous experience with breast feeding.  - Provide information as needed about early infant feeding cues (e.g., rooting, lip smacking, sucking fingers/hand) versus late cue of crying.  - Discuss/demonstrate breast feeding aids (e.g., infant sling, nursing footstool/pillows, and breast pumps). - Encourage mother/other family members to express feelings/concerns, and actively listen. - Educate father/SO about benefits of breast feeding and how to manage common lactation challenges. - Recommend avoidance of specific medications or substances incompatible with breast feeding.  - Assess and monitor for signs of nipple pain/trauma. - Instruct and provide assistance with proper latch. - Review techniques for milk expression (breast pumping) and storage of breast milk. Provide pumping equipment/supplies, instructions and assistance, as needed. - Encourage rooming-in and breast feeding on demand.  - Encourage skin-to-skin contact. - Provide LC support as needed. - Assess for and manage engorgement. - Provide breast feeding education handouts and information on community breast feeding support. Outcome: Completed  Goal: Appropriate maternal -  bonding  Description: INTERVENTIONS:  - Assess caregiver- interactions. - Assess caregiver's emotional status and coping mechanisms. - Encourage caregiver to participate in  daily care. - Assess support systems available to mother/family.  - Provide /case management support as needed.   Outcome: Completed

## 2023-02-20 NOTE — PLAN OF CARE
Problem: SAFETY ADULT - FALL  Goal: Free from fall injury  Description: INTERVENTIONS:  - Assess pt frequently for physical needs  - Identify cognitive and physical deficits and behaviors that affect risk of falls. - [place] fall precautions as indicated by assessment.  - Educate pt/family on patient safety including physical limitations  - Instruct pt to call for assistance with activity based on assessment  - Modify environment to reduce risk of injury  - Provide assistive devices as appropriate  - Consider OT/PT consult to assist with strengthening/mobility  - Encourage toileting schedule  Outcome: Progressing     Problem: POSTPARTUM  Goal: Long Term Goal:Experiences normal postpartum course  Description: INTERVENTIONS:  - Assess and monitor vital signs and lab values. - Assess fundus and lochia. - Provide ice/sitz baths for perineum discomfort. - Monitor healing of incision/episiotomy/laceration, and assess for signs and symptoms of infection and hematoma. - Assess bladder function and monitor for bladder distention.  - Provide/instruct/assist with pericare as needed. - Provide VTE prophylaxis as needed. - Monitor bowel function.  - Encourage ambulation and provide assistance as needed. - Assess and monitor emotional status and provide social service/psych resources as needed. - Utilize standard precautions and use personal protective equipment as indicated. Ensure aseptic care of all intravenous lines and invasive tubes/drains.  - Obtain immunization and exposure to communicable diseases history. Outcome: Progressing  Goal: Optimize infant feeding at the breast  Description: INTERVENTIONS:  - Initiate breast feeding within first hour after birth. - Monitor effectiveness of current breast feeding efforts. - Assess support systems available to mother/family.  - Identify cultural beliefs/practices regarding lactation, letdown techniques, maternal food preferences.   - Assess mother's knowledge and previous experience with breast feeding.  - Provide information as needed about early infant feeding cues (e.g., rooting, lip smacking, sucking fingers/hand) versus late cue of crying.  - Discuss/demonstrate breast feeding aids (e.g., infant sling, nursing footstool/pillows, and breast pumps). - Encourage mother/other family members to express feelings/concerns, and actively listen. - Educate father/SO about benefits of breast feeding and how to manage common lactation challenges. - Recommend avoidance of specific medications or substances incompatible with breast feeding.  - Assess and monitor for signs of nipple pain/trauma. - Instruct and provide assistance with proper latch. - Review techniques for milk expression (breast pumping) and storage of breast milk. Provide pumping equipment/supplies, instructions and assistance, as needed. - Encourage rooming-in and breast feeding on demand.  - Encourage skin-to-skin contact. - Provide LC support as needed. - Assess for and manage engorgement. - Provide breast feeding education handouts and information on community breast feeding support. Outcome: Progressing  Goal: Appropriate maternal -  bonding  Description: INTERVENTIONS:  - Assess caregiver- interactions. - Assess caregiver's emotional status and coping mechanisms. - Encourage caregiver to participate in  daily care. - Assess support systems available to mother/family.  - Provide /case management support as needed.   Outcome: Progressing

## 2023-02-21 ENCOUNTER — PATIENT OUTREACH (OUTPATIENT)
Dept: CASE MANAGEMENT | Age: 33
End: 2023-02-21

## 2023-02-21 NOTE — PROGRESS NOTES
1st attempt OBGYN Post Partum apt request  (delivered 02/19)    Dr Awilda Cash  [facility]  [address] [id number] [phone]  Follow up 6 weeks  LVM to call [phone] to assist w/scheduling apt; will try again  Pt called back  Apt made:   Wed 04/05 @4:00pm w/NELLY Cassidy  Confirmed w/pt  Closing encounter

## 2023-02-22 ENCOUNTER — TELEPHONE (OUTPATIENT)
Dept: OBGYN UNIT | Facility: HOSPITAL | Age: 33
End: 2023-02-22

## 2023-02-22 NOTE — PROGRESS NOTES
Reviewed self and infant care w / mom and dad, she verbalizes understanding of instructions reviewed. Encourage to follow up w/ MDs as directed and w/ questions/concerns.  Enc to join ct

## 2023-02-23 ENCOUNTER — NURSE ONLY (OUTPATIENT)
Dept: LACTATION | Facility: HOSPITAL | Age: 33
End: 2023-02-23
Attending: OBSTETRICS & GYNECOLOGY
Payer: COMMERCIAL

## 2023-02-23 ENCOUNTER — TELEPHONE (OUTPATIENT)
Dept: ORTHOPEDICS CLINIC | Facility: CLINIC | Age: 33
End: 2023-02-23

## 2023-02-23 DIAGNOSIS — M25.562 LEFT KNEE PAIN, UNSPECIFIED CHRONICITY: ICD-10-CM

## 2023-02-23 DIAGNOSIS — M54.50 LOW BACK PAIN, UNSPECIFIED BACK PAIN LATERALITY, UNSPECIFIED CHRONICITY, UNSPECIFIED WHETHER SCIATICA PRESENT: Primary | ICD-10-CM

## 2023-02-23 PROCEDURE — 99213 OFFICE O/P EST LOW 20 MIN: CPT

## 2023-02-23 NOTE — TELEPHONE ENCOUNTER
Future Appointments   Date Time Provider Deysi Gardenia   3/1/2023  2:30 PM Shaina Russell PA-C [facility]       This patient is coming for Sciatica and LT Knee Pain. No prior imaging done yet. Please advise if views are needed for this appt. Thanks.     Patient can be reached at [phone]

## 2023-02-23 NOTE — TELEPHONE ENCOUNTER
Pended orders for review. Patient is coming in for \"sciatica and left knee pain\". Please sign orders or advise otherwise. Then I will schedule and contact the patient. Thank you!

## 2023-02-24 ENCOUNTER — HOSPITAL ENCOUNTER (OUTPATIENT)
Dept: GENERAL RADIOLOGY | Age: 33
Discharge: HOME | End: 2023-02-24
Attending: PHYSICIAN ASSISTANT
Payer: COMMERCIAL

## 2023-02-24 DIAGNOSIS — M25.562 LEFT KNEE PAIN, UNSPECIFIED CHRONICITY: ICD-10-CM

## 2023-02-24 DIAGNOSIS — M54.50 LOW BACK PAIN, UNSPECIFIED BACK PAIN LATERALITY, UNSPECIFIED CHRONICITY, UNSPECIFIED WHETHER SCIATICA PRESENT: ICD-10-CM

## 2023-02-24 PROCEDURE — 72114 X-RAY EXAM L-S SPINE BENDING: CPT | Performed by: PHYSICIAN ASSISTANT

## 2023-02-28 NOTE — TELEPHONE ENCOUNTER
Patient is no longer having knee pain, cancelled request for XR. Patient rescheduled appointment due to having .

## 2023-04-05 ENCOUNTER — POSTPARTUM (OUTPATIENT)
Facility: CLINIC | Age: 33
End: 2023-04-05
Payer: COMMERCIAL

## 2023-04-05 VITALS
WEIGHT: 145 LBS | DIASTOLIC BLOOD PRESSURE: 70 MMHG | SYSTOLIC BLOOD PRESSURE: 114 MMHG | HEART RATE: 82 BPM | BODY MASS INDEX: 25.69 KG/M2 | HEIGHT: 63 IN

## 2023-04-05 PROCEDURE — 3074F SYST BP LT 130 MM HG: CPT

## 2023-04-05 PROCEDURE — 3008F BODY MASS INDEX DOCD: CPT

## 2023-04-05 PROCEDURE — 3078F DIAST BP <80 MM HG: CPT

## 2023-04-05 RX ORDER — DROSPIRENONE 4 MG/1
1 TABLET, FILM COATED ORAL DAILY
Qty: 28 TABLET | Refills: 11 | Status: SHIPPED | OUTPATIENT
Start: 2023-04-05

## 2023-10-05 ENCOUNTER — OFFICE VISIT (OUTPATIENT)
Facility: CLINIC | Age: 33
End: 2023-10-05
Payer: COMMERCIAL

## 2023-10-05 VITALS
SYSTOLIC BLOOD PRESSURE: 112 MMHG | HEIGHT: 63 IN | BODY MASS INDEX: 25.2 KG/M2 | HEART RATE: 89 BPM | DIASTOLIC BLOOD PRESSURE: 70 MMHG | WEIGHT: 142.19 LBS

## 2023-10-05 DIAGNOSIS — Z01.419 ENCOUNTER FOR WELL WOMAN EXAM WITH ROUTINE GYNECOLOGICAL EXAM: Primary | ICD-10-CM

## 2023-10-05 PROCEDURE — 99395 PREV VISIT EST AGE 18-39: CPT

## 2023-10-05 PROCEDURE — 3008F BODY MASS INDEX DOCD: CPT

## 2023-10-05 PROCEDURE — 3078F DIAST BP <80 MM HG: CPT

## 2023-10-05 PROCEDURE — 3074F SYST BP LT 130 MM HG: CPT

## 2023-10-05 RX ORDER — VITAMIN A, VITAMIN C, VITAMIN D-3, VITAMIN E, VITAMIN B-1, VITAMIN B-2, NIACIN, VITAMIN B-6, CALCIUM, IRON, ZINC, COPPER 4000; 120; 400; 22; 1.84; 3; 20; 10; 1; 12; 200; 27; 25; 2 [IU]/1; MG/1; [IU]/1; MG/1; MG/1; MG/1; MG/1; MG/1; MG/1; UG/1; MG/1; MG/1; MG/1; MG/1
1 TABLET ORAL DAILY
COMMUNITY
Start: 2022-07-19

## 2023-10-05 NOTE — PROGRESS NOTES
Darlene Lopez is a 28year old female [id number] Patient's last menstrual period was 09/15/2023 (exact date). Patient presents with:  Wellness Visit  . She wants to start trying to conceive next year. OBSTETRICS HISTORY:  OB History    Para Term  AB Living   2 1 1   1 1   SAB IAB Ectopic Multiple Live Births         0 1      # Outcome Date GA Lbr Lb/2nd Weight Sex Delivery Anes PTL Lv   2 Term 23 39w1d 04:30 :46 7 lb 1.9 oz (3.23 kg) M NORMAL SPONT EPI N CATHERINE      Complications: Oligohydramnios   1 2019              Obstetric Comments   Maternal uncle with congenital syndrome and Down Syndrome, FOB sister with possible patent foramen ovale, FOB brother with ? VSD & he had a heart attack at 12 -? Aneurysm of cardiac vessel. FOB sister with a pacemaker (? Reason?). Aunt with hypertrophic cardiomyopathy.        GYNE HISTORY:  Periods regular monthly    Sexual activity:   Yes      Partners:   Male        Pap Date: 22  Pap Result Notes: neg/neg        MEDICAL HISTORY:  Past Medical History:   Diagnosis Date    Anxiety 10/20/2022    Fibroids 2022    Hereditary disease in family possibly affecting fetus, affecting management of mother, antepartum condition or complication, not applicable or unspecified fetus 10/13/2022    Hyperlipidemia        SURGICAL HISTORY:  Past Surgical History:   Procedure Laterality Date    Enlarge breast with implant Bilateral     Suct donnell lipectomy,trunk  2016       SOCIAL HISTORY:  Social History    Socioeconomic History      Marital status:       Spouse name: Not on file      Number of children: Not on file      Years of education: Not on file      Highest education level: Not on file    Occupational History      Not on file    Tobacco Use      Smoking status: Never      Smokeless tobacco: Never    Vaping Use      Vaping Use: Never used    Substance and Sexual Activity      Alcohol use: Yes      Drug use: Never      Sexual activity: Yes        Partners: Male    Other Topics      Concerns:        Not on file    Social History Narrative      Not on file    Social Determinants of Health  Financial Resource Strain: Low Risk  (2/17/2023)      Financial Resource Strain          Difficulty of Paying Living Expenses: Not hard at all          Med Affordability: No  Food Insecurity: No Food Insecurity (2/17/2023)      Food Insecurity          Food Insecurity: Never true  Transportation Needs: No Transportation Needs (2/17/2023)      Transportation Needs          Lack of Transportation: No  Stress: No Stress Concern Present (2/17/2023)      Stress          Feeling of Stress : No  Housing Stability: Low Risk  (2/17/2023)      Housing Stability          Housing Instability: No          Housing Instability Emergency: Not on file    FAMILY HISTORY:  Family History   Problem Relation Age of Onset    Lipids Father         unknown    Hypertension Father         unknown    Prostate Cancer Maternal Grandfather     Cancer Maternal Grandfather         Colon Cancer    Hypertension Paternal Grandmother     Lipids Paternal Grandfather        MEDICATIONS:    Current Outpatient Medications:     Prenatal Vit-Fe Fumarate-FA (PRENATAL VITAMIN PLUS LOW IRON) 27-1 MG Oral Tab, Take 1 tablet by mouth daily. , Disp: , Rfl:     Drospirenone (SLYND) 4 MG Oral Tab, Take 1 tablet by mouth daily. (Patient not taking: Reported on 10/5/2023), Disp: 28 tablet, Rfl: 11    ALLERGIES:  No Known Allergies      Review of Systems:  Constitutional:  Denies fatigue, night sweats, hot flashes  Eyes:  denies blurred or double vision  Cardiovascular:  denies chest pain or palpitations  Respiratory:  denies shortness of breath  Gastrointestinal:  denies heartburn, abdominal pain, diarrhea or constipation  Genitourinary:  denies dysuria, incontinence, abnormal vaginal discharge, vaginal itching  Musculoskeletal:  denies back pain. Skin/Breast:  Denies any breast pain, lumps, or discharge. Neurological:  denies headaches, extremity weakness or numbness. Psychiatric: denies depression or anxiety. Endocrine:   denies excessive thirst or urination. Heme/Lymph:  denies history of anemia, easy bruising or bleeding. PHYSICAL EXAM:   Constitutional: well developed, well nourished  Head/Face: normocephalic  Neck/Thyroid: thyroid symmetric, no thyromegaly, no nodules, no adenopathy  Lymphatic:no abnormal supraclavicular or axillary adenopathy is noted  Breast: normal without palpable masses, tenderness, asymmetry, nipple discharge, nipple retraction or skin changes  Abdomen:  soft, nontender, nondistended, no masses  Skin/Hair: no unusual rashes or bruises  Extremities: no edema, no cyanosis  Psychiatric:  Oriented to time, place, person and situation.  Appropriate mood and affect    Pelvic Exam:  External Genitalia: normal appearance, hair distribution, and no lesions  Urethral Meatus:  normal in size, location, without lesions and prolapse  Bladder:  No fullness, masses or tenderness  Vagina:  Normal appearance without lesions, no abnormal discharge  Cervix:  Normal without tenderness on motion  Uterus: normal in size, contour, position, mobility, without tenderness  Adnexa: normal without masses or tenderness  Perineum: normal  Anus: no hemorroids     Assessment & Plan:  Diagnoses and all orders for this visit:    Encounter for well woman exam with routine gynecological exam

## 2024-06-19 ENCOUNTER — TELEPHONE (OUTPATIENT)
Facility: CLINIC | Age: 34
End: 2024-06-19

## 2024-06-19 ENCOUNTER — INITIAL PRENATAL (OUTPATIENT)
Facility: CLINIC | Age: 34
End: 2024-06-19

## 2024-06-19 ENCOUNTER — LAB ENCOUNTER (OUTPATIENT)
Dept: LAB | Facility: HOSPITAL | Age: 34
End: 2024-06-19
Attending: STUDENT IN AN ORGANIZED HEALTH CARE EDUCATION/TRAINING PROGRAM

## 2024-06-19 VITALS
HEIGHT: 63 IN | BODY MASS INDEX: 24.98 KG/M2 | SYSTOLIC BLOOD PRESSURE: 110 MMHG | HEART RATE: 67 BPM | DIASTOLIC BLOOD PRESSURE: 66 MMHG | WEIGHT: 141 LBS

## 2024-06-19 DIAGNOSIS — O09.522 AMA (ADVANCED MATERNAL AGE) MULTIGRAVIDA 35+, SECOND TRIMESTER (HCC): ICD-10-CM

## 2024-06-19 DIAGNOSIS — Z34.92 PRENATAL CARE IN SECOND TRIMESTER (HCC): ICD-10-CM

## 2024-06-19 DIAGNOSIS — Z82.79 FAMILY HISTORY OF CONGENITAL HEART DEFECT: ICD-10-CM

## 2024-06-19 DIAGNOSIS — Z34.90 PRENATAL CARE, ANTEPARTUM (HCC): ICD-10-CM

## 2024-06-19 DIAGNOSIS — Z11.3 SCREEN FOR STD (SEXUALLY TRANSMITTED DISEASE): ICD-10-CM

## 2024-06-19 DIAGNOSIS — Z34.90 PRENATAL CARE, ANTEPARTUM (HCC): Primary | ICD-10-CM

## 2024-06-19 LAB
ANTIBODY SCREEN: NEGATIVE
BASOPHILS # BLD AUTO: 0.02 X10(3) UL (ref 0–0.2)
BASOPHILS NFR BLD AUTO: 0.2 %
BILIRUBIN: NEGATIVE
DEPRECATED HBV CORE AB SER IA-ACNC: 55.3 NG/ML
EOSINOPHIL # BLD AUTO: 0.04 X10(3) UL (ref 0–0.7)
EOSINOPHIL NFR BLD AUTO: 0.5 %
ERYTHROCYTE [DISTWIDTH] IN BLOOD BY AUTOMATED COUNT: 12.8 %
GLUCOSE (URINE DIPSTICK): NEGATIVE MG/DL
HBV SURFACE AG SER-ACNC: <0.1 [IU]/L
HBV SURFACE AG SERPL QL IA: NONREACTIVE
HCT VFR BLD AUTO: 37.5 %
HGB BLD-MCNC: 12.9 G/DL
IMM GRANULOCYTES # BLD AUTO: 0.04 X10(3) UL (ref 0–1)
IMM GRANULOCYTES NFR BLD: 0.5 %
KETONES (URINE DIPSTICK): NEGATIVE MG/DL
LEUKOCYTES: NEGATIVE
LYMPHOCYTES # BLD AUTO: 2.33 X10(3) UL (ref 1–4)
LYMPHOCYTES NFR BLD AUTO: 28.4 %
MCH RBC QN AUTO: 30.6 PG (ref 26–34)
MCHC RBC AUTO-ENTMCNC: 34.4 G/DL (ref 31–37)
MCV RBC AUTO: 89.1 FL
MONOCYTES # BLD AUTO: 0.2 X10(3) UL (ref 0.1–1)
MONOCYTES NFR BLD AUTO: 2.4 %
MULTISTIX LOT#: NORMAL NUMERIC
NEUTROPHILS # BLD AUTO: 5.57 X10 (3) UL (ref 1.5–7.7)
NEUTROPHILS # BLD AUTO: 5.57 X10(3) UL (ref 1.5–7.7)
NEUTROPHILS NFR BLD AUTO: 68 %
NITRITE, URINE: NEGATIVE
OCCULT BLOOD: NEGATIVE
PH, URINE: 5.5 (ref 4.5–8)
PLATELET # BLD AUTO: 263 10(3)UL (ref 150–450)
PROTEIN (URINE DIPSTICK): NEGATIVE MG/DL
RBC # BLD AUTO: 4.21 X10(6)UL
RH BLOOD TYPE: POSITIVE
RUBV IGG SER QL: POSITIVE
RUBV IGG SER-ACNC: 179.1 IU/ML (ref 10–?)
SPECIFIC GRAVITY: 1.01 (ref 1–1.03)
T PALLIDUM AB SER QL IA: NONREACTIVE
UROBILINOGEN,SEMI-QN: 0.2 MG/DL (ref 0–1.9)
WBC # BLD AUTO: 8.2 X10(3) UL (ref 4–11)

## 2024-06-19 PROCEDURE — 86780 TREPONEMA PALLIDUM: CPT

## 2024-06-19 PROCEDURE — 87591 N.GONORRHOEAE DNA AMP PROB: CPT | Performed by: STUDENT IN AN ORGANIZED HEALTH CARE EDUCATION/TRAINING PROGRAM

## 2024-06-19 PROCEDURE — 87086 URINE CULTURE/COLONY COUNT: CPT | Performed by: STUDENT IN AN ORGANIZED HEALTH CARE EDUCATION/TRAINING PROGRAM

## 2024-06-19 PROCEDURE — 3078F DIAST BP <80 MM HG: CPT | Performed by: STUDENT IN AN ORGANIZED HEALTH CARE EDUCATION/TRAINING PROGRAM

## 2024-06-19 PROCEDURE — 82728 ASSAY OF FERRITIN: CPT

## 2024-06-19 PROCEDURE — 86900 BLOOD TYPING SEROLOGIC ABO: CPT

## 2024-06-19 PROCEDURE — 3074F SYST BP LT 130 MM HG: CPT | Performed by: STUDENT IN AN ORGANIZED HEALTH CARE EDUCATION/TRAINING PROGRAM

## 2024-06-19 PROCEDURE — 3008F BODY MASS INDEX DOCD: CPT | Performed by: STUDENT IN AN ORGANIZED HEALTH CARE EDUCATION/TRAINING PROGRAM

## 2024-06-19 PROCEDURE — 87340 HEPATITIS B SURFACE AG IA: CPT

## 2024-06-19 PROCEDURE — 87491 CHLMYD TRACH DNA AMP PROBE: CPT | Performed by: STUDENT IN AN ORGANIZED HEALTH CARE EDUCATION/TRAINING PROGRAM

## 2024-06-19 PROCEDURE — 86762 RUBELLA ANTIBODY: CPT

## 2024-06-19 PROCEDURE — 85025 COMPLETE CBC W/AUTO DIFF WBC: CPT

## 2024-06-19 PROCEDURE — 86850 RBC ANTIBODY SCREEN: CPT

## 2024-06-19 PROCEDURE — 86901 BLOOD TYPING SEROLOGIC RH(D): CPT

## 2024-06-19 PROCEDURE — 36415 COLL VENOUS BLD VENIPUNCTURE: CPT

## 2024-06-19 PROCEDURE — 81002 URINALYSIS NONAUTO W/O SCOPE: CPT | Performed by: STUDENT IN AN ORGANIZED HEALTH CARE EDUCATION/TRAINING PROGRAM

## 2024-06-19 PROCEDURE — 87389 HIV-1 AG W/HIV-1&-2 AB AG IA: CPT

## 2024-06-19 RX ORDER — FAMOTIDINE 20 MG/1
20 TABLET, FILM COATED ORAL 2 TIMES DAILY
Qty: 60 TABLET | Refills: 3 | Status: SHIPPED | OUTPATIENT
Start: 2024-06-19

## 2024-06-19 NOTE — TELEPHONE ENCOUNTER
Patients name &  verified with patient   Lab tubes labeled   NIPT and carrier screen lab drawn  Patient tolerated well. Test given to PSR for processing and send out.   Joni information given and patient instructed to call in 10 to 30 days to discuss results. Patient verbalized understanding.

## 2024-06-19 NOTE — PROGRESS NOTES
[facility]  Obstetrics and Gynecology  History & Physical    CC: Establish prenatal care     Subjective:     HPI:  Kristyn Casas is a 34 year old  female at 16.6 by LMP presents today to establish prenatal care.     Preconception planning  - Pregnancy is planned  - Patient was trying to conceive for 1 week  - Previous birth control: no  - Had been taking prenatal vitamin/folic acid prior to pregnancy: yes    Today    Was in [place]   Patient denies VB, LOF, abdominal or pelvic pain.    No longer has nausea or fatigue.    Reports her mood is good  Coughing in the morning from reflux    ROS   Negative unless otherwise stated    OB history    OB History    Para Term  AB Living   3 1 1   1 1   SAB IAB Ectopic Multiple Live Births         0 1      # Outcome Date GA Lbr Lb/2nd Weight Sex Type Anes PTL Lv   3 Current            2 Term 23 39w1d 04:30  02:46 7 lb 1.9 oz (3.23 kg) M NORMAL SPONT EPI N CATHERINE      Complications: Oligohydramnios   1 2019              Obstetric Comments   Maternal uncle with congenital syndrome and Down Syndrome, FOB sister with possible patent foramen ovale, FOB brother with ? VSD & he had a heart attack at 16 -? Aneurysm of cardiac vessel. FOB sister with a pacemaker (? Reason?). Aunt with hypertrophic cardiomyopathy.       Medical history    Past Medical History:   Diagnosis Date    Anxiety 10/20/2022    Fibroids 2022    Hereditary disease in family possibly affecting fetus, affecting management of mother, antepartum condition or complication, not applicable or unspecified fetus (HCC) 10/13/2022    Hyperlipidemia        Surgical history    Past Surgical History:   Procedure Laterality Date    Enlarge breast with implant Bilateral     Suct donnell lipectomy,trunk  2016        MEDS:    Current Outpatient Medications on File Prior to Visit   Medication Sig Dispense Refill    Prenatal Vit-Fe Fumarate-FA (PRENATAL VITAMIN PLUS LOW IRON) 27-1  MG Oral Tab Take 1 tablet by mouth daily.       No current facility-administered medications on file prior to visit.       Allergies:      No Known Allergies    Family Hx/Carrier status    Family History   Problem Relation Age of Onset    Lipids Father         unknown    Hypertension Father         unknown    Prostate Cancer Maternal Grandfather     Cancer Maternal Grandfather         Colon Cancer    Hypertension Paternal Grandmother     Lipids Paternal Grandfather        SocialHx:      Social History     Socioeconomic History    Marital status:    Tobacco Use    Smoking status: Never     Passive exposure: Never    Smokeless tobacco: Never   Vaping Use    Vaping status: Never Used   Substance and Sexual Activity    Alcohol use: Not Currently    Drug use: Never    Sexual activity: Yes     Partners: Male     Social Determinants of Health     Financial Resource Strain: Low Risk  (2/17/2023)    Financial Resource Strain     Difficulty of Paying Living Expenses: Not hard at all     Med Affordability: No   Food Insecurity: No Food Insecurity (2/17/2023)    Food Insecurity     Food Insecurity: Never true   Transportation Needs: No Transportation Needs (2/17/2023)    Transportation Needs     Lack of Transportation: No   Stress: No Stress Concern Present (2/17/2023)    Stress     Feeling of Stress : No   Housing Stability: Low Risk  (2/17/2023)    Housing Stability     Housing Instability: No         Objective:   /66   Pulse 67   Ht 63\"   Wt 141 lb (64 kg)   LMP 02/22/2024   BMI 24.98 kg/m²   Estimated body mass index is 24.98 kg/m² as calculated from the following:    Height as of this encounter: 63\".    Weight as of this encounter: 141 lb (64 kg).    PE:  General: normal appearance  HEENT: normocephalic  Breast: normal contour  Respiratory: normal work of breathing, no extra use of accessory muscles  Cardiac: normal rate  Abdominal: Nontender to palpation, no masses palpated - fundus palpated underneath  the umbilicus  MSK: normal range of motion  Neuro: normal movement, normal sensory  Skin: no abnormalities seen    :  - normal appearing external genitalia  - normal appearing vagina, well estrogenized, physiologic discharge  - multiparous closed cervix without masses   - uterus feels about 16 weeks             Assessment/Plan:     Kristyn Casas is a 34 year old  at 16.6 based on LMP    #Routine prenatal care  - NEEDS DATING US - WILL DO WITH L2US W MFM  - Labs: NOB   - Last pap:  nl   - NIPT: desires  - Carrier: desires   - Partner: new  - Counseling discussed:   -- prenatal schedule for visits, labs and imaging  -- nutrition, PNVs, physical activity, sexual activity, vaccinations    #normal wt (BMI 18.5-24.9)  [ ] recommended wt gain 25-35 lbs during preg    #FOB's family with multiple heart conditions  - 's brother with VSD  - 's sister with patent foramen ovale  - 's cousin who is 7 months old just got heart surgery for ?  [ ] anatomy scan ordered, will    #AMA   [ ] given healthy clinical status, declined L2US w MFM. Anatomy scan ordered w us.  [ ] growth US at 32 wks,   [ ] For 35-40 y/o: NSTs weekly at 36         RTC 4 wk    Mounika Toth MD   [facility]

## 2024-06-20 LAB
C TRACH DNA SPEC QL NAA+PROBE: NEGATIVE
N GONORRHOEA DNA SPEC QL NAA+PROBE: NEGATIVE

## 2024-06-25 ENCOUNTER — TELEPHONE (OUTPATIENT)
Facility: CLINIC | Age: 34
End: 2024-06-25

## 2024-06-25 LAB — AMB EXT MYRIAD FORESIGHT: NEGATIVE

## 2024-06-26 ENCOUNTER — TELEPHONE (OUTPATIENT)
Facility: CLINIC | Age: 34
End: 2024-06-26

## 2024-07-18 ENCOUNTER — ROUTINE PRENATAL (OUTPATIENT)
Facility: CLINIC | Age: 34
End: 2024-07-18
Payer: COMMERCIAL

## 2024-07-18 VITALS
HEART RATE: 94 BPM | BODY MASS INDEX: 25.09 KG/M2 | WEIGHT: 141.63 LBS | HEIGHT: 63 IN | SYSTOLIC BLOOD PRESSURE: 112 MMHG | DIASTOLIC BLOOD PRESSURE: 68 MMHG

## 2024-07-18 DIAGNOSIS — O09.522 AMA (ADVANCED MATERNAL AGE) MULTIGRAVIDA 35+, SECOND TRIMESTER (HCC): Primary | ICD-10-CM

## 2024-07-18 PROCEDURE — 3078F DIAST BP <80 MM HG: CPT

## 2024-07-18 PROCEDURE — 3074F SYST BP LT 130 MM HG: CPT

## 2024-07-18 PROCEDURE — 3008F BODY MASS INDEX DOCD: CPT

## 2024-07-18 NOTE — PROGRESS NOTES
MICHELLE 21w0d -visit #2     She is doing well, no complaints     LAURIE by LMP (dating US not done, [place] was [place])   -per G.K. note - dating US will be done at L2US appt -7/24     -NIPT & Carrier - neg  -Partner: Liam GIRON family history of multiple heart conditions  - brother with VSD  - sister with patent foramen ovale  - cousin who is 7 months old had heart surgery for ?  -her son has NO HEART CONDITIONS   -plan on Fetal Echo per MFM recommendations    AMA   -plan L2US, growths, NSts per MFM recommendations

## 2024-07-24 ENCOUNTER — ULTRASOUND ENCOUNTER (OUTPATIENT)
Dept: PERINATAL CARE | Facility: HOSPITAL | Age: 34
End: 2024-07-24
Attending: OBSTETRICS & GYNECOLOGY
Payer: COMMERCIAL

## 2024-07-24 ENCOUNTER — OFFICE VISIT (OUTPATIENT)
Dept: PERINATAL CARE | Facility: HOSPITAL | Age: 34
End: 2024-07-24
Attending: STUDENT IN AN ORGANIZED HEALTH CARE EDUCATION/TRAINING PROGRAM
Payer: COMMERCIAL

## 2024-07-24 VITALS
SYSTOLIC BLOOD PRESSURE: 112 MMHG | BODY MASS INDEX: 25.16 KG/M2 | DIASTOLIC BLOOD PRESSURE: 67 MMHG | HEART RATE: 83 BPM | HEIGHT: 63 IN | WEIGHT: 142 LBS

## 2024-07-24 DIAGNOSIS — O35.2XX0: ICD-10-CM

## 2024-07-24 DIAGNOSIS — Z82.79 FAMILY HISTORY OF CONGENITAL HEART DEFECT: ICD-10-CM

## 2024-07-24 DIAGNOSIS — Z82.79 FAMILY HISTORY OF CONGENITAL HEART DISEASE: ICD-10-CM

## 2024-07-24 DIAGNOSIS — Z82.79 FAMILY HISTORY OF CONGENITAL HEART DISEASE: Primary | ICD-10-CM

## 2024-07-24 DIAGNOSIS — O09.522 AMA (ADVANCED MATERNAL AGE) MULTIGRAVIDA 35+, SECOND TRIMESTER (HCC): ICD-10-CM

## 2024-07-24 PROCEDURE — 76811 OB US DETAILED SNGL FETUS: CPT | Performed by: OBSTETRICS & GYNECOLOGY

## 2024-07-24 NOTE — PROGRESS NOTES
Reason for Consult:   Dear Dr. Holden,    Thank you for requesting ultrasound evaluation and maternal fetal medicine consultation on Kristyn Casas.  As you are aware she is a 34 year old female with a James pregnancy at 21w6d.  A maternal-fetal medicine consultation was requested secondary to  AMA and family h/o congenital heart defectx.  Her prenatal records and labs were reviewed.    Review of History:     OB History:    OB History    Para Term  AB Living   3 1 1 0 1 1   SAB IAB Ectopic Multiple Live Births   0 0 0 0 1      # Outcome Date GA Lbr Lb/2nd Weight Sex Type Anes PTL Lv   3 Current            2 Term 23 39w1d 04:30 / 02:46 7 lb 1.9 oz (3.23 kg) M NORMAL SPONT EPI N CATHERINE      Complications: Oligohydramnios      Name: LUCIO CASAS,BOY      Apgar1: 9  Apgar5: 9   1 AB 2019              Obstetric Comments   Maternal uncle with congenital syndrome and Down Syndrome, FOB sister with possible patent foramen ovale, FOB brother with ? VSD & he had a heart attack at 16 -? Aneurysm of cardiac vessel. FOB sister with a pacemaker (? Reason?). Aunt with hypertrophic cardiomyopathy.             Allergies:  No Known Allergies   Current Meds:  Current Outpatient Medications   Medication Sig Dispense Refill    Prenatal Vit-Fe Fumarate-FA (PRENATAL VITAMIN PLUS LOW IRON) 27-1 MG Oral Tab Take 1 tablet by mouth daily.      famotidine 20 MG Oral Tab Take 1 tablet (20 mg total) by mouth 2 (two) times daily. (Patient not taking: Reported on 2024) 60 tablet 3        HISTORY:  Past Medical History:    Anxiety    Fibroids    Hereditary disease in family possibly affecting fetus, affecting management of mother, antepartum condition or complication, not applicable or unspecified fetus (HCC)    Hyperlipidemia    Screening for genetic disease carrier status    [employer] Carrier Screen = Negative      Past Surgical History:   Procedure Laterality Date    Enlarge breast with implant Bilateral  2008    Suct donnell lipectomy,trunk  2016      Family History   Problem Relation Age of Onset    Lipids Father         unknown    Hypertension Father         unknown    Prostate Cancer Maternal Grandfather     Cancer Maternal Grandfather         Colon Cancer    Hypertension Paternal Grandmother     Lipids Paternal Grandfather       Social History     Socioeconomic History    Marital status:    Tobacco Use    Smoking status: Never     Passive exposure: Never    Smokeless tobacco: Never   Vaping Use    Vaping status: Never Used   Substance and Sexual Activity    Alcohol use: Not Currently    Drug use: Never    Sexual activity: Yes     Partners: Male     Social Determinants of Health     Financial Resource Strain: Low Risk  (2/17/2023)    Financial Resource Strain     Difficulty of Paying Living Expenses: Not hard at all     Med Affordability: No   Food Insecurity: No Food Insecurity (2/17/2023)    Food Insecurity     Food Insecurity: Never true   Transportation Needs: No Transportation Needs (2/17/2023)    Transportation Needs     Lack of Transportation: No   Stress: No Stress Concern Present (2/17/2023)    Stress     Feeling of Stress : No   Housing Stability: Low Risk  (2/17/2023)    Housing Stability     Housing Instability: No        NARRATIVE:     /67 (BP Location: Right arm, Patient Position: Sitting, Cuff Size: adult)   Pulse 83   Ht 5' 3\" (1.6 m)   Wt 142 lb (64.4 kg)   LMP 02/22/2024   BMI 25.15 kg/m²           Alert and Oriented.  No acute distress          Abdomen:  soft, nontender, no contractions noted.           extremities:  nontender, no edema         DISCUSSION  During her visit we discussed and reviewed the following issues:  ADVANCED MATERNAL AGE    Background  I reviewed with the patient that pregnancies in women of advanced maternal age (35 or older at delivery) are associated with elevated risks. Specifically, there is a higher rate of:  Fetal  malformations  Preeclampsia  Gestational diabetes  Intrauterine fetal death    As a result, enhanced pregnancy surveillance is advised for these patients including a comprehensive ultrasound to assess for fetal malformations (at 20 weeks) and a third trimester ultrasound assessment for fetal growth (at 32 weeks). In addition, weekly NST's (initiating at 36 weeks gestation for women 35-39 years and at 32 weeks gestation for women 40 years and older) are also advised. Routine obstetric care is more than adequate to assess for gestational diabetes and preeclampsia; hence, no further significant alterations in obstetric care are advised.    Medical Complications    Women 35 years of age or older can expect to experience two to three fold higher rates of hospitalization,  delivery, and pregnancy-related complications when compared to their younger counterparts.  The two most common medical problems complicating these  pregnanccies are hypertension and diabetes.   The incidence of preeclampsia in the general obstetric population is 3 to 4 percent; this increases to 5 to 10 percent in women over age 40 and is as high as 35 percent in women over age 50.   The incidence of gestational diabetes in the general obstetric population is 3 percent, rising to 7 to 12 percent in women over age 40 and 20 percent in women over age 50.  Women 35 years of age or older are more likely to be delivered by . The  delivery rate in the general obstetric population of the [place] is almost 30 percent, compared to almost 50 percent in women over age 40 to 45 and almost 80 percent in women age 50 to 63.          Fetal Death        A decision analysis tool using data from the [place] [employer]  Database predicted a strategy of weekly antepartum testing and labor induction would lower the risk of unexplained fetal death in women 35 years of age or older. In this model, weekly testing starting at 36 weeks of  gestation would drop the risk of fetal death from 5.2 to 1.3 per 1000 pregnancies. While a policy of antepartum testing in older women does increase the chance that a women will be induced (71 inductions per fetal death averted) and thereby increases her risk of having a  delivery, only 14 additional cesareans would need to be performed to avert one unexplained fetal death.  Hence, weekly NST's are advised for women of advanced maternal age; testing should be initiated at 36 weeks for women 35-39 years and at 32 weeks for women 40 years and older.    Fetal Malformations    Cardiac malformations, clubfoot, and diaphragmatic hernia appear to occur with increased frequency in offspring of older women. These abnormalities are structural and unrelated to aneuploidy, thus they would not be detected by karyotype analysis.  For these reasons a complete, detailed ultrasound (level II) is advised even if the fetus has a normal karyotype.      Fetal Aneuploidy      Invasive Testing  I offered invasive genetic testing (amniocentesis, chorionic villus sampling) after reviewing the diagnostic accuracy of these tests as well as the procedure associated loss rate (1:500 for genetic amniocentesis).        We discussed  the increased risk of chromosomal abnormalities associated with advanced maternal age at age 34 year old. She understands that ultrasound exam cannot exclude potential genetic abnormalities.  Her estimated risk based on maternal age at amniocentesis with any chromosome abnormality is about 1:140  and with Down Syndrome is about 1: 250.   We also discussed the risks and benefits of having  genetic testing ([facility] and amniocentesis) performed.      Non-invasive Pregnancy Testing (NIPT)  I reviewed current non-invasive screening options. Currently non-invasive pregnancy testing (NIPT) offers the highest detection rate (with the lowest false positive rate) for the detection of fetal aneuploidy amongst high-risk  patients. The limitations of detailed mid-trimester sonography was reviewed with the patient. First trimester screening and second trimester multiple-marker serum serum screening as alternative aneuploidy screening options were also reviewed. However, both of these tests are associated with lower detection and higher false positive rates.            -------------    Congenital heart disease (CHD) is the most common congenital disorder in newborns.   Prenatal identification and management of fetal cardiac abnormalities are important because congenital anomalies are the leading cause of infant death and congenital heart disease accounts for 30 to 50 percent of these deaths.  Prenatal diagnosis of cardiac disease provides parents an opportunity to obtain prognostic information prior to birth, learn about treatment options before and after delivery, make decisions concerning the management approach that is best for their family, and plan for specific needs at birth.   Critical CHD, defined as requiring surgery or catheter-based intervention in the first year of life, occurs in approximately 25 percent of those with CHD. Although many newborns with critical CHD are symptomatic and identified soon after birth, others are not diagnosed until after discharge from the birth hospitalization. In infants with critical cardiac lesions, the risk of morbidity and mortality increases when there is a delay in diagnosis and timely referral to a tertiary center with expertise in treating these patients.     The reported prevalence of CHD at birth ranges from 6 to 13 per 1000 live births.  Offspring of women with congenital heart disease are at increased risk of congenital heart defects. The risk of recurrent congenital heart disease varies with the specific parental defect.   The largest experience is from a series of [id number] pregnancies in which one parent or sibling had congenital heart disease. Recurrence in the fetus was detected by  echocardiography 2.7 percent.    Indications for Fetal Echocardiogram --with higher risk profile (estimated >2 percent absolute risk) include:  ?Maternal pregestational diabetes mellitus   ?Maternal phenylketonuria (uncontrolled)   ?Maternal autoantibodies (SSA/SSB)  ?Maternal cardiac teratogens   ?Maternal first trimester rubella infection   ?Maternal infection with suspicion of fetal myocarditis   ?Pregnancy conceived by assisted reproduction technology (ART)   ?CHD in first degree relative of fetus   ?First or second degree relative with disorder with Mendelian inheritance with CHD association   ?Fetal cardiac abnormality suspected on obstetric ultrasound  ?Fetal noncardiac abnormality suspected on obstetric ultrasound  ?Fetal chromosome abnormality  ?Fetal tachycardia or bradycardia, or frequent or persistent irregular heart rhythm   ?Fetal increased nuchal translucency >95 percentile (?3 mm) on first trimester sonogram   ?Monochorionic twinning   ?Fetal hydrops or effusions     Indications with lower risk profile (estimated >1 and <2 percent absolute risk) include:  ?Maternal medications (anticonvulsants, lithium, vitamin A, paroxetine, NSAIDs in first/second trimester)  ?CHD in second degree relative of fetus  ?Fetal abnormality of the umbilical cord or placenta (eg, single umbilical artery, agenesis of the ductus venosus)  ?Fetal intraabdominal venous anomaly        OB ULTRASOUND REPORT   See imaging tab for complete ultrasound report or in PACS    Single IUP in breech presentation.    Placenta is anterior.   A 3 vessel cord is noted.  Cardiac activity is present at 148 bpm   g ( 1 lb 0 oz);   MVP is 5.1 cm .       Uterus and adnexa appeared normal  today on US      Fetal Anatomy:  Visualized with normal appearance: head, face, spine, neck, skin, chest, abdominal wall, gastrointestinal tract, kidneys, bladder, extremities.   Brain: Visualized and normal appearances: brain parenchyma, cerebral  ventricles, choroid plexus, Cisterna Magna, midline falx, cerebellum, cerebellar lobes, posterior fossa, vermis, cavum septi pellucidi.  Face: eyes normal, profile normal, nose normal, lip normal, palate normal.  Heart: visualized and normal appearance: 3 vessel view, four-chamber, left outflow tract, right outflow tract, arches.      Genetic Sonogram:  Nuchal fold normal.  Pyelectasis absent.  No hyperechogenic bowel.  Echogenic intracardiac foci absent. Nasal bone present. Choroid plexus cyst absent.      Summary of Ultrasound findings:  This is a James pregnancy    The fetal measurements are consistent with established EDC. No gross ultrasound evidence of structural abnormalities are seen today. No minor markers for aneuploidy are seen. The patient understands that ultrasound cannot rule out all structural and chromosomal abnormalities.       IMPRESSION:   1. IUP @  21w6d  2. Scan consistent with dates  3. No fetal structural abnormalities seen  4. AMA--  NIPT low risk  5.  Family h/o congenital heart defect    RECOMMENDATIONS:   1.  Weekly NST at 36wks  2.  Growth US at 32wks  3.   Fetal echocardiogram in 3 wks    Thank you for allowing me to participate in the care of your patient.  Please do not hesitate to call with any questions or concerns.     Total time spent   45  minutes this calendar day which includes preparing to see the patient including chart review, obtaining and/or reviewing additional medical history, performing a physical exam and evaluation, documenting clinical information in the electronic medical record, independently interpreting results, counseling the patient, communicating results to the patient/family/caregiver and coordinating care.    Rock Armstrong D.O.  Maternal Fetal Medicine     Note to patient and family:  The 21st Century Cures Act makes medical notes available to patients in the interest of transparency.  However, please be advised that this is a medical document.  It is  intended as a peer to peer communication.  It is written in medical language and may contain abbreviations or verbiage that are technical and unfamiliar.  It may appear blunt or direct.  Medical documents are intended to carry relevant information, facts as evident, and the clinical opinion of the practitioner.

## 2024-08-14 ENCOUNTER — ROUTINE PRENATAL (OUTPATIENT)
Facility: CLINIC | Age: 34
End: 2024-08-14
Payer: COMMERCIAL

## 2024-08-14 VITALS
HEART RATE: 90 BPM | BODY MASS INDEX: 25.76 KG/M2 | DIASTOLIC BLOOD PRESSURE: 70 MMHG | HEIGHT: 63 IN | WEIGHT: 145.38 LBS | SYSTOLIC BLOOD PRESSURE: 120 MMHG

## 2024-08-14 DIAGNOSIS — O09.292: ICD-10-CM

## 2024-08-14 DIAGNOSIS — N89.8 VAGINAL DISCHARGE DURING PREGNANCY IN SECOND TRIMESTER (HCC): ICD-10-CM

## 2024-08-14 DIAGNOSIS — O99.340 ANXIETY DISORDER AFFECTING PREGNANCY, ANTEPARTUM (HCC): ICD-10-CM

## 2024-08-14 DIAGNOSIS — Z11.3 SCREENING EXAMINATION FOR STD (SEXUALLY TRANSMITTED DISEASE): ICD-10-CM

## 2024-08-14 DIAGNOSIS — F41.9 ANXIETY DISORDER AFFECTING PREGNANCY, ANTEPARTUM (HCC): ICD-10-CM

## 2024-08-14 DIAGNOSIS — Z13.0 SCREENING, ANEMIA, DEFICIENCY, IRON: ICD-10-CM

## 2024-08-14 DIAGNOSIS — O09.522 AMA (ADVANCED MATERNAL AGE) MULTIGRAVIDA 35+, SECOND TRIMESTER (HCC): Primary | ICD-10-CM

## 2024-08-14 DIAGNOSIS — O26.892 HEADACHE IN PREGNANCY, ANTEPARTUM, SECOND TRIMESTER (HCC): ICD-10-CM

## 2024-08-14 DIAGNOSIS — Z82.79 FAMILY HISTORY OF CONGENITAL HEART DISEASE: ICD-10-CM

## 2024-08-14 DIAGNOSIS — O26.892 VAGINAL DISCHARGE DURING PREGNANCY IN SECOND TRIMESTER (HCC): ICD-10-CM

## 2024-08-14 DIAGNOSIS — R51.9 HEADACHE IN PREGNANCY, ANTEPARTUM, SECOND TRIMESTER (HCC): ICD-10-CM

## 2024-08-14 DIAGNOSIS — Z36.83 ENCOUNTER FOR FETAL SCREENING FOR CONGENITAL CARDIAC ABNORMALITIES (HCC): ICD-10-CM

## 2024-08-14 DIAGNOSIS — Z13.1 SCREENING FOR DIABETES MELLITUS: ICD-10-CM

## 2024-08-14 PROBLEM — E66.3 OVERWEIGHT (BMI 25.0-29.9): Status: ACTIVE | Noted: 2024-08-14

## 2024-08-14 PROBLEM — Z34.83 PRENATAL CARE, SUBSEQUENT PREGNANCY IN THIRD TRIMESTER (HCC): Status: RESOLVED | Noted: 2023-01-19 | Resolved: 2024-08-14

## 2024-08-14 PROBLEM — Z34.93 PREGNANCY WITH PRENATAL CARE ELSEWHERE IN THIRD TRIMESTER (HCC): Status: RESOLVED | Noted: 2023-01-19 | Resolved: 2024-08-14

## 2024-08-14 PROBLEM — Z34.90 PREGNANCY (HCC): Status: RESOLVED | Noted: 2023-02-17 | Resolved: 2024-08-14

## 2024-08-14 PROBLEM — Z86.16 HISTORY OF COVID-19: Status: RESOLVED | Noted: 2023-01-19 | Resolved: 2024-08-14

## 2024-08-14 PROBLEM — O28.8 AFI (AMNIOTIC FLUID INDEX) BORDERLINE LOW: Status: RESOLVED | Noted: 2023-01-20 | Resolved: 2024-08-14

## 2024-08-14 PROBLEM — R93.89 ABNORMAL ULTRASOUND: Status: RESOLVED | Noted: 2022-05-05 | Resolved: 2024-08-14

## 2024-08-14 PROBLEM — O09.90 SUPERVISION OF HIGH RISK PREGNANCY, ANTEPARTUM (HCC): Status: RESOLVED | Noted: 2022-10-20 | Resolved: 2024-08-14

## 2024-08-14 PROBLEM — G56.00 CARPAL TUNNEL SYNDROME DURING PREGNANCY (HCC): Status: RESOLVED | Noted: 2023-01-20 | Resolved: 2024-08-14

## 2024-08-14 PROBLEM — D21.9 FIBROIDS: Status: RESOLVED | Noted: 2022-05-05 | Resolved: 2024-08-14

## 2024-08-14 PROBLEM — O26.899 CARPAL TUNNEL SYNDROME DURING PREGNANCY (HCC): Status: RESOLVED | Noted: 2023-01-20 | Resolved: 2024-08-14

## 2024-08-14 LAB
CREAT UR-SCNC: 16.4 MG/DL
PROT UR-MCNC: <6 MG/DL (ref ?–14)

## 2024-08-14 PROCEDURE — 82570 ASSAY OF URINE CREATININE: CPT | Performed by: OBSTETRICS & GYNECOLOGY

## 2024-08-14 PROCEDURE — 81514 NFCT DS BV&VAGINITIS DNA ALG: CPT | Performed by: OBSTETRICS & GYNECOLOGY

## 2024-08-14 PROCEDURE — 3008F BODY MASS INDEX DOCD: CPT | Performed by: OBSTETRICS & GYNECOLOGY

## 2024-08-14 PROCEDURE — 84156 ASSAY OF PROTEIN URINE: CPT | Performed by: OBSTETRICS & GYNECOLOGY

## 2024-08-14 PROCEDURE — 3074F SYST BP LT 130 MM HG: CPT | Performed by: OBSTETRICS & GYNECOLOGY

## 2024-08-14 PROCEDURE — 3078F DIAST BP <80 MM HG: CPT | Performed by: OBSTETRICS & GYNECOLOGY

## 2024-08-14 NOTE — PROGRESS NOTES
MICHELLE visit #3    Chief Complaint   Patient presents with    Prenatal Care     MICHELLE:24w 6d     -Patient reports white discharge for about 3 weeks, no odor.  -Patient reports she will be traveling to [place] via airplane next week, for one week.  -reports having headache almost everyday after breakfast and taking prenatal vitamins for about 2 weeks      +FM. Belly tight at times. Just a little uncomfortable if walking around. No leaking fluid, vaginal bleeding. Very active during the day     #Vaginal discharge  -for 2-3 weeks  -no malodor  -not needing a liner  -no itching  -white color     #Headache  -has breakfast & when she takes prenatal vitamin right after breakfast, gets a headache for the last 2 wk  -has tried taking prenatal vitamin at night & that helps.   -HA lasts for about 1 hour.   -does feel thirsty.   -does not track water intake in a day. Could do water.   -BP cuff - not at home right now.   -forehead and around temples to both sides of head  -sneezing in the morning - just a few times. This is recent  -no congestion   -No vision changes  -No epigastric pain.      Vitals:    24 1413   BP: 120/70   Pulse: 90   Weight: 145 lb 6.4 oz (66 kg)   Height: 63\"     TWG 1 lb 6.4 oz (0.635 kg)     34 year old  at 24w6d   LAURIE 24 by LMP c/w 21w5d US(dating US not done, pt was overseas for 2 months)   O+    -NIPT & Carrier - neg  -Partner: Liam   -1 hr GTT, CBC discussed & ordered.   -3rd trim HIV & syphilis - order at next   -Tdap discussed. Offer at next  -pediatrician, breast pump, car seat discussed    Headaches, 2024  -/70 at 24w6d. C/o HA daily for 2 wk  -urine P/C, CBC, CMP, uric acid  -suspect dehydration and/or allergy issue. Discussed claritin, zyrtec, nasal saline, nasal steroid spray  -push fluids, more frequent resting    Vaginal discharge, 2024   -discharge white, thin, no malodor, no clumping  -cervix digitally clsoed   -BD molecular swab collected    Dating/Late  care   -initial OB visit at 16w6d by LMP   -did not have early US, patient was overseas for 2 months  -L2 US with MFM - size consistent with dates. Normal    FOB family history of multiple heart conditions  - brother with VSD  - sister with patent foramen ovale  - cousin who is 7 months old had heart surgery for ?  -her son has NO HEART CONDITIONS   -L2 US normal  -fetal echocardiogram per MFM - appt 8/20. Order placed.     AMA   -L2 US normal  -growth US with BPP at 32 wk - order placed.   -NST weekly at 36 wk     H/o oligohydramnios at 39w1d (2023)  -check RADHA around 36-37 wk unless otherwise advised per MFM    Anxiety  -meds: N  -therapist N  -BH referral - declines. Doing well.   -mental health resources provided in AVS    Overweight  -wt gain goal 15-25 lb discussed.    RTC 4 wk

## 2024-08-14 NOTE — PATIENT INSTRUCTIONS
Claritin or Zyrtec  Nasal saline   Nasal steroid spray - Flonase, Nasocort.     Labs     Schedule growth ultrasound with MFM when you are there for fetal echocardiogram.     Limit weight gain to 15-25 lb for the entire pregnancy  Consider Tdap     Tdap (Tetanus, Diptheria, Pertussis)   -booster shot for pertussis (whooping cough)  -recommended by the CDC (Centers for Disease Control) for every pregnant woman in the third trimester (28 weeks and beyond)  -the purpose of giving the vaccine during pregnancy is so that the mother can share her antibodies with the fetus across the placenta  -it is recommended to take this vaccine early in the third trimester so that your body has enough time to produce the antibodies that can pass across the placenta to the fetus  -the infant cannot be vaccinated for pertussis until 2 months of age due to an immature immune system and lack of response to the vaccine prior to that time.   -the father of the baby as well as grandparents, other caregivers, etc should receive a booster shot for whooping cough as well (vaccination at least 1 time after the age of 18 is sufficient)     Mental Health Resources    Call 911 or seek the nearest Emergency Department if you have thoughts of self harm or harm to others with a plan.      1.[facility] Call or text the [facility] Helpline at [phone],  or chat with us,  M-F, 10 a.m. - 10 p.m. ET.   In a crisis? Call or text 922.  Support group  HAY GRUPOS DE APOYO DISPONIBLES EN TU  COMUNIDAD CERCA DE TI (O POR INTERNET).  A través de los E.E.U.U., hay 49 organizaciones  estatales y 648 afiliadas locales asociadas con [facility],  ofreciendo programas gratis, de calidad y guiados por  pares a cientos de miles de personas cada año.  [facility] Connection es un luz maria de apoyo semanal o bisemanal  para personas con enfermedades de essie mental.  [facility] Family Support Group es un luz maria de apoyo semanal o  mensual para amistades, parejas, o familiares de personas  con  enfermedades de essie mental.  Explore [facility] support groups available in your community:  visit [email or web address]  (Ambos grupos están disponibles en español en  localidades particulares. )     2. You may reach out to [facility] at [phone], they offer 24-hour resource and referral for depression and support. There is a specific Mom's Line at 370-185-2663903.141.9616 988 Suicide and Crisis Lifeline (24/7)     National Suicide Prevention Hotline (24/7) - 670-991-WJBL (9598)     National Crisis Text Line (24/7) - Text HELLO to 572393 - https://www.crisistextline.org     Twwo8Gkgb Text line (24/7) - Text TALK to 015687 or NICR (for [place])     [place] Qgqv6Ozkf Text line - specifically for individuals struggling with stress related to the COVID-19 pandemic and in need of emotional support. Anonymous (only provide zip code) to connect to local resources     Warm Line - [phone] - (Mon-Fri, 8am - 5pm)  [place] Warm Line - if you or one of your family members has a mental health and or substance use challenges and would like support by phone. Wellness support specialists are professionals who have experienced MH or substance use recovery in their own lives. Not crisis support. , but provide recovery support, mentoring and advocacy.     CARES Line (Crisis and Referral Entry Services) (24/7) - [phone] - Children, youth and families  [facility] - 24/7 crisis line if your child is a risk to themselves or others, having a mental health crisis or you would like a referral to services for children, youth and families.  Provides support and resources to individuals and families experiencing mental illness      [facility] Prenatal Classes (and virtual tour of Labor & Delivery unit)  [email or web address]  [phone]    Pre-registration for the hospital  [email or web address]    Breast pump  -contact your insurance to request them to send an order to us for their  preferred medical supply company  Or  -contact [employer] (flyer available on the lobby wall across from reception desk)   [email or web address]    Car seat *MUST* be installed before infant(s) can be leave the hospital    Doctor for baby   *Please select a pediatrician or family medicine provider BEFORE you are admitted to the hospital to give birth.   Pediatrics (birth-18 years of age) or Family Medicine (birth through adulthood)  Make sure that provider accepts your insurance.   Pick a provider that is close to where you live.  If the provider is on staff at [place], the nursing staff will notify them when your infant is born so they are aware to come to the hospital to examine the infant.   If the provider you have chosen is not on staff at [facility], a pediatric hospitalist will care for your infant during the hospitalization only. You must arrange the follow up visit with your provider.   After delivery at the hospital follow up visit instructions for baby will be provided prior to discharge.     The baby will not be able to leave the hospital without a follow up visit scheduled.     To establish care:  [email or web address]  Or   [facility] Physician Referral line at [phone]    There are MANY providers available. It would be impossible to list them all, but here are a few popular pediatrics groups in [place]:    [facility] [place] [phone]  [facility] [place] [phone]  [facility] [place] [phone]  [facility] [place] [phone]  [facility] [place] [phone]  [facility] [phone]    There are also many wonderful independent practitioners as well. We recommend you speak with family, friends, colleagues as personal recommendations can be very helpful.

## 2024-08-15 LAB
BV BACTERIA DNA VAG QL NAA+PROBE: NEGATIVE
C GLABRATA DNA VAG QL NAA+PROBE: NEGATIVE
C KRUSEI DNA VAG QL NAA+PROBE: NEGATIVE
CANDIDA DNA VAG QL NAA+PROBE: NEGATIVE
T VAGINALIS DNA VAG QL NAA+PROBE: NEGATIVE

## 2024-08-15 NOTE — PROGRESS NOTES
Patient aware of Urine and vaginitis results and recommendations.Vaginitis swab negative at 24w6d.   Urine P/C too low to calculate at 24w6d = normal    Patient verbalized understanding.

## 2024-08-18 ENCOUNTER — TELEPHONE (OUTPATIENT)
Age: 34
End: 2024-08-18

## 2024-08-20 ENCOUNTER — ULTRASOUND ENCOUNTER (OUTPATIENT)
Dept: PERINATAL CARE | Facility: HOSPITAL | Age: 34
End: 2024-08-20
Attending: OBSTETRICS & GYNECOLOGY
Payer: COMMERCIAL

## 2024-08-20 VITALS
DIASTOLIC BLOOD PRESSURE: 67 MMHG | HEART RATE: 85 BPM | BODY MASS INDEX: 25.87 KG/M2 | WEIGHT: 146 LBS | HEIGHT: 63 IN | SYSTOLIC BLOOD PRESSURE: 114 MMHG

## 2024-08-20 DIAGNOSIS — Z82.79 FAMILY HISTORY OF CONGENITAL HEART DISEASE: ICD-10-CM

## 2024-08-20 DIAGNOSIS — Z82.79 FAMILY HISTORY OF CONGENITAL HEART DEFECT: ICD-10-CM

## 2024-08-20 DIAGNOSIS — O09.522 MULTIGRAVIDA OF ADVANCED MATERNAL AGE IN SECOND TRIMESTER (HCC): ICD-10-CM

## 2024-08-20 DIAGNOSIS — O35.2XX0: ICD-10-CM

## 2024-08-20 DIAGNOSIS — O09.522 MULTIGRAVIDA OF ADVANCED MATERNAL AGE IN SECOND TRIMESTER (HCC): Primary | ICD-10-CM

## 2024-08-20 PROCEDURE — 93325 DOPPLER ECHO COLOR FLOW MAPG: CPT

## 2024-08-20 PROCEDURE — 76825 ECHO EXAM OF FETAL HEART: CPT | Performed by: OBSTETRICS & GYNECOLOGY

## 2024-08-20 PROCEDURE — 76827 ECHO EXAM OF FETAL HEART: CPT

## 2024-08-20 NOTE — PROGRESS NOTES
Outpatient Maternal-Fetal Medicine Follow-Up     Dear Dr. Toth,     Thank you for requesting ultrasound evaluation and maternal fetal medicine consultation on your patient Kristyn Casas.  As you are aware she is a 34 year old female  with a James pregnancy and an Estimated Date of Delivery: 24.  She returned to maternal-fetal medicine today for a follow-up visit.  Her history was reviewed from her prior visit and there were no interval changes.     Antepartum Risk Factors  Advanced maternal age  Family history of congenital heart defect/ cardiac syndrome      S: Feeling fetal movement.  No concerns or complaints at this time.    PHYSICAL EXAMINATION:  /67 (BP Location: Right arm, Patient Position: Sitting, Cuff Size: adult)   Pulse 85   Ht 5' 3\" (1.6 m)   Wt 146 lb (66.2 kg)   LMP 2024   BMI 25.86 kg/m²   General: alert and oriented, no acute distress  Abdomen: gravid, soft, non-tender  Extremities: non-tender, no edema     OBSTETRIC ULTRASOUND  The patient had a fetal echocardiogram ultrasound today which I interpreted the results and reviewed them with the patient.    FETAL ECHOCARDIOGRAM:    Single IUP in transverse presentation.    Placenta is anterior.   A 3 vessel cord is noted.  Cardiac activity is present at 145 bpm  MVP is 4.9 cm .      A transabdominal 2D  fetal echocardiogram with Doppler and color mapping was performed. There is a four-chamber heart of appropriate cardiac dimensions. There is situs solitus with levocardia. Intracardiac connection appear to be normal.  The  atrioventricular valves appear normal. There is no evidence of pericardial or pleural effusion. The A-V conduction is one to one and the heart rate is appropriate for gestational age. No evidence of fetal arrhythmias is seen during today's study. There is a right to left shunting across the patent daryn ovale. There is a normal appearance of the aorta and branching pattern of  the head vessels.    Normal fetal Doppler interrogations    There appears to be a structurally  normal fetal heart and rhythm. The patient was made aware of the limitations of the fetal heart study: malformations such as but not limiting to minor valve , VSD, anomalous pulmonary venus return, or coarctation of the aorta maybe missed. I discussed the results with the patient.       See imaging tab for the complete US report.     DISCUSSION  During her visit we discussed and reviewed the following issues:  ADVANCED MATERNAL AGE -discussed previously and reviewed.  See MFM note from 7/24/2024 for detailed review    I reviewed with the patient that pregnancies in women of advanced maternal age (35 or older at delivery) are associated with elevated risks. Specifically, there is a higher rate of:  Fetal malformations  Preeclampsia  Gestational diabetes  Intrauterine fetal death    As a result, enhanced pregnancy surveillance is advised for these patients including a comprehensive ultrasound to assess for fetal malformations (at 20 weeks) and a third trimester ultrasound assessment for fetal growth (at 32 weeks). In addition, weekly NST's (initiating at 36 weeks gestation for women 35-39 years and at 32 weeks gestation for women 40 years and older) are also advised. Routine obstetric care is more than adequate to assess for gestational diabetes and preeclampsia; hence, no further significant alterations in obstetric care are advised.    Family h/o CHD -  FOB sister had repair of  a hole in her heart at age 1  The incidence and recurrence risks of congenital heart disease was discussed previously.  See MFM note from 7/24/2024 for detailed review.  We reviewed the role of fetal echocardiogram and its limitations.    IMPRESSION:  IUP at 25w5d  Normal fetal echocardiogram  Advanced maternal age  Family history of congenital heart defect     RECOMMENDATIONS:  Continue care with Dr. Toth  Weekly NST at 36  weeks  Fetal growth and BPP at 32 weeks     Total time spent 30 minutes this calendar day which includes preparing to see the patient including chart review, obtaining and/or reviewing additional medical history, performing a physical exam and evaluation, documenting clinical information in the electronic medical record, independently interpreting results, counseling the patient, communicating results to the patient/family/caregiver and coordinating care.     Case discussed with patient who demonstrated understanding and agreement with plan.     Thank you for allowing me to participate in the care of this patient.  Please feel free to contact me with any questions.    Eden Leigh MD  Maternal-Fetal Medicine       Note to patient and family:  The 21st Century Cures Act makes medical notes available to patients in the interest of transparency.  However, please be advised that this is a medical document.  It is intended as a peer to peer communication.  It is written in medical language and may contain abbreviations or verbiage that are technical and unfamiliar.  It may appear blunt or direct.  Medical documents are intended to carry relevant information, facts as evident, and the clinical opinion of the practitioner.

## 2024-08-20 NOTE — PROGRESS NOTES
Pt here for fetal echo  + FM  Pt states feeling occas abd tighteness, feels like she needs to drink more water throughout the day. Pt denies vag bleeding and leaking fluid.

## 2024-08-26 ENCOUNTER — TELEPHONE (OUTPATIENT)
Facility: CLINIC | Age: 34
End: 2024-08-26

## 2024-08-26 NOTE — TELEPHONE ENCOUNTER
Patient is feeling discomfort around rectum, no bleeding, but she believes she could have hemorrhoids. Advised patient to reach out to primary care provider. Understanding verbalized.

## 2024-09-12 ENCOUNTER — ROUTINE PRENATAL (OUTPATIENT)
Facility: CLINIC | Age: 34
End: 2024-09-12
Payer: COMMERCIAL

## 2024-09-12 VITALS
SYSTOLIC BLOOD PRESSURE: 116 MMHG | DIASTOLIC BLOOD PRESSURE: 64 MMHG | WEIGHT: 148 LBS | HEIGHT: 63 IN | BODY MASS INDEX: 26.22 KG/M2 | HEART RATE: 83 BPM

## 2024-09-12 DIAGNOSIS — Z11.3 SCREEN FOR STD (SEXUALLY TRANSMITTED DISEASE): ICD-10-CM

## 2024-09-12 DIAGNOSIS — Z23 ENCOUNTER FOR IMMUNIZATION: ICD-10-CM

## 2024-09-12 DIAGNOSIS — Z3A.29 29 WEEKS GESTATION OF PREGNANCY (HCC): Primary | ICD-10-CM

## 2024-09-12 PROCEDURE — 3078F DIAST BP <80 MM HG: CPT | Performed by: STUDENT IN AN ORGANIZED HEALTH CARE EDUCATION/TRAINING PROGRAM

## 2024-09-12 PROCEDURE — 3008F BODY MASS INDEX DOCD: CPT | Performed by: STUDENT IN AN ORGANIZED HEALTH CARE EDUCATION/TRAINING PROGRAM

## 2024-09-12 PROCEDURE — 90715 TDAP VACCINE 7 YRS/> IM: CPT | Performed by: STUDENT IN AN ORGANIZED HEALTH CARE EDUCATION/TRAINING PROGRAM

## 2024-09-12 PROCEDURE — 3074F SYST BP LT 130 MM HG: CPT | Performed by: STUDENT IN AN ORGANIZED HEALTH CARE EDUCATION/TRAINING PROGRAM

## 2024-09-12 PROCEDURE — 90471 IMMUNIZATION ADMIN: CPT | Performed by: STUDENT IN AN ORGANIZED HEALTH CARE EDUCATION/TRAINING PROGRAM

## 2024-09-12 RX ORDER — CLOBETASOL PROPIONATE 0.5 MG/G
1 OINTMENT TOPICAL 2 TIMES DAILY
COMMUNITY
Start: 2024-08-27

## 2024-09-12 NOTE — PROGRESS NOTES
MICHELLE visit #4    Chief Complaint   Patient presents with    Prenatal Care     29 weeks  Pt wants to discuss discharge that's been ongoing. Was brought up at her last visit and test came back fine. Wants reassurance, but not another test      Denies Ucx, VB, LOF. + FM    Only complaint today is hemorrhoids. Did see doctor and was prescribed stool softener and ointment.     Has not completed GTT or CBC/CMP/UPC. Advised to complete - added HIV and T pal for 3rd tri screening.     Discussed Tdap - amenable to getting today.     Vitals:    24 1539   BP: 116/64   Pulse: 83   Weight: 148 lb (67.1 kg)   Height: 63\"       TWG 4 lb (1.814 kg)     34 year old  at 29w0d   LAURIE 24 by LMP c/w 21w5d US(dating US not done, [place])   O+    -NIPT & Carrier - neg  -Partner: Liam   -1 hr GTT, CBC discussed & ordered - pt still has to complete  -3rd trim HIV & syphilis - ordered  -Tdap today  -pediatrician, breast pump, car seat discussed    Headaches, 2024  -/70 at 24w6d. C/o HA daily for 2 wk  -urine P/C, CBC, CMP, uric acid  -suspect dehydration and/or allergy issue. Discussed claritin, zyrtec, nasal saline, nasal steroid spray  -push fluids, more frequent resting  -now improved     Dating/Late care   -initial OB visit at 16w6d by LMP   -did not have early US, [place] was [place] 2 [place]  -L2 US with MFM - size consistent with dates. Normal    FOB family history of multiple heart conditions  - brother with VSD  - sister with patent foramen ovale  - cousin who is 7 months old had heart surgery for ?  -her son has NO HEART CONDITIONS   -L2 US normal  -fetal echocardiogram 24: normal    AMA   -L2 US normal  -growth US with BPP at 32 wk - scheduled for 10/3  -NST weekly at 36 wk     H/o oligohydramnios at 39w1d ()  -check RADHA around 36-37 wk unless otherwise advised per [facility]    Anxiety  -meds: N  -therapist N  -BH referral - declines. Doing well.    -mental health resources provided in AVS    Overweight  -wt gain goal 15-25 lb discussed.    RTC 2 wk

## 2024-09-14 ENCOUNTER — LAB ENCOUNTER (OUTPATIENT)
Dept: LAB | Age: 34
End: 2024-09-14
Attending: OBSTETRICS & GYNECOLOGY
Payer: COMMERCIAL

## 2024-09-14 DIAGNOSIS — Z3A.29 29 WEEKS GESTATION OF PREGNANCY (HCC): ICD-10-CM

## 2024-09-14 DIAGNOSIS — Z13.1 SCREENING FOR DIABETES MELLITUS: ICD-10-CM

## 2024-09-14 DIAGNOSIS — R51.9 HEADACHE IN PREGNANCY, ANTEPARTUM, SECOND TRIMESTER (HCC): ICD-10-CM

## 2024-09-14 DIAGNOSIS — O26.892 HEADACHE IN PREGNANCY, ANTEPARTUM, SECOND TRIMESTER (HCC): ICD-10-CM

## 2024-09-14 DIAGNOSIS — Z13.0 SCREENING, ANEMIA, DEFICIENCY, IRON: ICD-10-CM

## 2024-09-14 LAB
ALBUMIN SERPL-MCNC: 3.8 G/DL (ref 3.2–4.8)
ALBUMIN/GLOB SERPL: 1.3 {RATIO} (ref 1–2)
ALP LIVER SERPL-CCNC: 102 U/L
ALT SERPL-CCNC: 11 U/L
ANION GAP SERPL CALC-SCNC: 9 MMOL/L (ref 0–18)
AST SERPL-CCNC: 14 U/L (ref ?–34)
BASOPHILS # BLD AUTO: 0.02 X10(3) UL (ref 0–0.2)
BASOPHILS NFR BLD AUTO: 0.3 %
BILIRUB SERPL-MCNC: 0.5 MG/DL (ref 0.3–1.2)
BUN BLD-MCNC: 8 MG/DL (ref 9–23)
CALCIUM BLD-MCNC: 8.9 MG/DL (ref 8.7–10.4)
CHLORIDE SERPL-SCNC: 106 MMOL/L (ref 98–112)
CO2 SERPL-SCNC: 23 MMOL/L (ref 21–32)
CREAT BLD-MCNC: 0.57 MG/DL
EGFRCR SERPLBLD CKD-EPI 2021: 121 ML/MIN/1.73M2 (ref 60–?)
EOSINOPHIL # BLD AUTO: 0.02 X10(3) UL (ref 0–0.7)
EOSINOPHIL NFR BLD AUTO: 0.3 %
ERYTHROCYTE [DISTWIDTH] IN BLOOD BY AUTOMATED COUNT: 13.3 %
FASTING STATUS PATIENT QL REPORTED: YES
GLOBULIN PLAS-MCNC: 2.9 G/DL (ref 2–3.5)
GLUCOSE 1H P GLC SERPL-MCNC: 123 MG/DL
GLUCOSE BLD-MCNC: 114 MG/DL (ref 70–99)
HCT VFR BLD AUTO: 36.8 %
HGB BLD-MCNC: 12.2 G/DL
IMM GRANULOCYTES # BLD AUTO: 0.03 X10(3) UL (ref 0–1)
IMM GRANULOCYTES NFR BLD: 0.5 %
LYMPHOCYTES # BLD AUTO: 1.45 X10(3) UL (ref 1–4)
LYMPHOCYTES NFR BLD AUTO: 23.1 %
MCH RBC QN AUTO: 31.4 PG (ref 26–34)
MCHC RBC AUTO-ENTMCNC: 33.2 G/DL (ref 31–37)
MCV RBC AUTO: 94.8 FL
MONOCYTES # BLD AUTO: 0.21 X10(3) UL (ref 0.1–1)
MONOCYTES NFR BLD AUTO: 3.3 %
NEUTROPHILS # BLD AUTO: 4.55 X10 (3) UL (ref 1.5–7.7)
NEUTROPHILS # BLD AUTO: 4.55 X10(3) UL (ref 1.5–7.7)
NEUTROPHILS NFR BLD AUTO: 72.5 %
OSMOLALITY SERPL CALC.SUM OF ELEC: 285 MOSM/KG (ref 275–295)
PLATELET # BLD AUTO: 222 10(3)UL (ref 150–450)
POTASSIUM SERPL-SCNC: 3.6 MMOL/L (ref 3.5–5.1)
PROT SERPL-MCNC: 6.7 G/DL (ref 5.7–8.2)
RBC # BLD AUTO: 3.88 X10(6)UL
SODIUM SERPL-SCNC: 138 MMOL/L (ref 136–145)
T PALLIDUM AB SER QL IA: NONREACTIVE
URATE SERPL-MCNC: 3.4 MG/DL
WBC # BLD AUTO: 6.3 X10(3) UL (ref 4–11)

## 2024-09-14 PROCEDURE — 82950 GLUCOSE TEST: CPT | Performed by: OBSTETRICS & GYNECOLOGY

## 2024-09-14 PROCEDURE — 84550 ASSAY OF BLOOD/URIC ACID: CPT | Performed by: OBSTETRICS & GYNECOLOGY

## 2024-09-14 PROCEDURE — 80053 COMPREHEN METABOLIC PANEL: CPT | Performed by: OBSTETRICS & GYNECOLOGY

## 2024-09-14 PROCEDURE — 86780 TREPONEMA PALLIDUM: CPT | Performed by: STUDENT IN AN ORGANIZED HEALTH CARE EDUCATION/TRAINING PROGRAM

## 2024-09-14 PROCEDURE — 87389 HIV-1 AG W/HIV-1&-2 AB AG IA: CPT | Performed by: STUDENT IN AN ORGANIZED HEALTH CARE EDUCATION/TRAINING PROGRAM

## 2024-09-14 PROCEDURE — 85025 COMPLETE CBC W/AUTO DIFF WBC: CPT | Performed by: OBSTETRICS & GYNECOLOGY

## 2024-09-26 ENCOUNTER — ROUTINE PRENATAL (OUTPATIENT)
Facility: CLINIC | Age: 34
End: 2024-09-26
Payer: COMMERCIAL

## 2024-09-26 VITALS
BODY MASS INDEX: 26.22 KG/M2 | WEIGHT: 148 LBS | SYSTOLIC BLOOD PRESSURE: 100 MMHG | HEIGHT: 63 IN | DIASTOLIC BLOOD PRESSURE: 60 MMHG | HEART RATE: 70 BPM

## 2024-09-26 DIAGNOSIS — Z23 NEED FOR VACCINATION: ICD-10-CM

## 2024-09-26 DIAGNOSIS — O09.523 AMA (ADVANCED MATERNAL AGE) MULTIGRAVIDA 35+, THIRD TRIMESTER (HCC): ICD-10-CM

## 2024-09-26 DIAGNOSIS — Z3A.31 31 WEEKS GESTATION OF PREGNANCY (HCC): Primary | ICD-10-CM

## 2024-09-26 PROCEDURE — 90471 IMMUNIZATION ADMIN: CPT

## 2024-09-26 PROCEDURE — 3008F BODY MASS INDEX DOCD: CPT

## 2024-09-26 PROCEDURE — 90656 IIV3 VACC NO PRSV 0.5 ML IM: CPT

## 2024-09-26 PROCEDURE — 3078F DIAST BP <80 MM HG: CPT

## 2024-09-26 PROCEDURE — 3074F SYST BP LT 130 MM HG: CPT

## 2024-09-26 NOTE — PROGRESS NOTES
Ike 31w0d    She is feeling pelvic discomfort - reassured pt   -she would like to be IOL at 39 weeks, her mom lives overseas and she needs for her to come and watch her son when she delivers.     LAURIE 11/28/24 by LMP c/w 21w5d US(dating US not done, [place] was [place] 2 [place])   O+     -NIPT & Carrier - neg  -Partner: Liam   -1 hr GTT, CBC -normal   -3rd trim HIV & syphilis done  -Tdap & flu done  -pediatrician, breast pump, car seat discussed     Headaches, 8/14/2024  -/70 at 24w6d. C/o HA daily for 2 wk  -urine P/C, CBC, CMP, uric acid  -suspect dehydration and/or allergy issue. Discussed claritin, zyrtec, nasal saline, nasal steroid spray  -push fluids, more frequent resting  -now improved 9/12     Dating/Late care   -initial OB visit at 16w6d by LMP   -did not have early US, [place] was [place] for 2 [place]  -L2 US with MFM - size consistent with dates. Normal     FOB family history of multiple heart conditions  - brother with VSD  - sister with patent foramen ovale  - cousin who is 7 months old had heart surgery for ?  -her son has NO HEART CONDITIONS   -L2 US normal  -fetal echocardiogram 8/20/24: normal     AMA   -L2 US normal  -growth US with BPP at 32 wk - scheduled for 10/3  -NST weekly at 36 wk      H/o oligohydramnios at 39w1d (2023)  -check RADHA around 36-37 wk unless otherwise advised per MFM     Anxiety  -meds: N  -therapist N  - referral - declines. Doing well.   -mental health resources provided in AVS     Overweight  -wt gain goal 15-25 lb discussed.     RTC 2 wk

## 2024-09-26 NOTE — PROGRESS NOTES
Outpatient Maternal-Fetal Medicine Follow-Up    Dear Dr. Partida    Thank you for requesting an ultrasound and maternal-fetal medicine consultation on your patient Kristyn Casas.  As you are aware she is a 34 year old female  with a keane pregnancy and an Estimated Date of Delivery: 24.  She returned to maternal-fetal medicine today for a follow-up visit.  Her history was reviewed from her prior visit and there were no interval changes.    Antepartum Risk Factors  AMA: low-risk cell free fetal DNA, declined invasive testing  Family history of congenital heart defect - Normal fetal echocardiogram    PHYSICAL EXAMINATION:  /77 (BP Location: Right arm, Patient Position: Sitting, Cuff Size: adult)   Pulse 98   Ht 5' 3\" (1.6 m)   Wt 151 lb (68.5 kg)   LMP 2024   BMI 26.75 kg/m²   General: alert and oriented, no acute distress  Abdomen: gravid, soft, non-tender  Extremities: non-tender, no edema    OBSTETRIC ULTRASOUND  The patient had a follow-up growth ultrasound today which revealed normal interval fetal growth, BPP 8/8.    Ultrasound Findings:  Single IUP in cephalic presentation.    Placenta is anterior.   A 3 vessel cord, 3 vessel cord is noted.  Cardiac activity is present at 138 bpm  EFW 1951 g ( 4 lb 5 oz); 52%.    RADHA is  10.2 cm.  MVP is 4.4 cm  BPP is 8/8.     The fetal measurements are consistent with established EDC. No gross ultrasound evidence of structural abnormalities are seen today. The patient understands that ultrasound cannot rule out all structural and chromosomal abnormalities.     See Imaging Tab For Complete Ultrasound Report  I interpreted the results and reviewed them with the patient.    DISCUSSION  During her visit we discussed and reviewed the following issues:  ADVANCED MATERNAL AGE  See prior MFM notes for a detailed review.  She did not desire invasive genetic testing.   She has already obtained a low-risk NPIT result and was appropriately  reassured.     IMPRESSION:  IUP at 32w0d  AMA: low-risk cell free fetal DNA, declined invasive testing  Family history of congenital heart defect - Normal fetal echocardiogram    RECOMMENDATIONS:  Continue care with   Weekly NST at 36 weeks    Thank you for allowing me to participate in the care of your patient.  Please do not hesitate to contact me if additional questions or concerns arise.      Milo Acosta M.D.    20 minutes spent in review of records, patient consultation, documentation and coordination of care.  The relevant clinical matter(s) are summarized above.     Note to patient and family  The 21st Century Cures Act makes medical notes available to patients in the interest of transparency.  However, please be advised that this is a medical document.  It is intended as [id number] communication.  It is written and medical language may contain abbreviations or verbiage that are technical and unfamiliar.  It may appear blunt or direct.  Medical documents are intended to carry relevant information, facts as evident, and the clinical opinion of the practitioner.

## 2024-10-03 ENCOUNTER — ULTRASOUND ENCOUNTER (OUTPATIENT)
Dept: PERINATAL CARE | Facility: HOSPITAL | Age: 34
End: 2024-10-03
Attending: OBSTETRICS & GYNECOLOGY
Payer: COMMERCIAL

## 2024-10-03 VITALS
HEIGHT: 63 IN | WEIGHT: 151 LBS | SYSTOLIC BLOOD PRESSURE: 113 MMHG | BODY MASS INDEX: 26.75 KG/M2 | DIASTOLIC BLOOD PRESSURE: 77 MMHG | HEART RATE: 98 BPM

## 2024-10-03 DIAGNOSIS — O09.523 AMA (ADVANCED MATERNAL AGE) MULTIGRAVIDA 35+, THIRD TRIMESTER (HCC): ICD-10-CM

## 2024-10-03 DIAGNOSIS — Z82.79 FAMILY HISTORY OF CONGENITAL HEART DISEASE: Primary | ICD-10-CM

## 2024-10-03 DIAGNOSIS — Z82.79 FAMILY HISTORY OF CONGENITAL HEART DISEASE: ICD-10-CM

## 2024-10-03 PROCEDURE — 76819 FETAL BIOPHYS PROFIL W/O NST: CPT

## 2024-10-03 PROCEDURE — 76816 OB US FOLLOW-UP PER FETUS: CPT | Performed by: OBSTETRICS & GYNECOLOGY

## 2024-10-03 NOTE — PROGRESS NOTES
Pt here for growth ultrasound  + FM  Pt c/o lower abd pressure and occas uterine cramping, denies vag bleeding and leaking fluid.

## 2024-10-08 ENCOUNTER — ROUTINE PRENATAL (OUTPATIENT)
Facility: CLINIC | Age: 34
End: 2024-10-08
Payer: COMMERCIAL

## 2024-10-08 ENCOUNTER — TELEPHONE (OUTPATIENT)
Facility: CLINIC | Age: 34
End: 2024-10-08

## 2024-10-08 VITALS
HEART RATE: 88 BPM | WEIGHT: 151 LBS | HEIGHT: 63 IN | SYSTOLIC BLOOD PRESSURE: 110 MMHG | BODY MASS INDEX: 26.75 KG/M2 | DIASTOLIC BLOOD PRESSURE: 60 MMHG

## 2024-10-08 DIAGNOSIS — O99.340 ANXIETY DISORDER AFFECTING PREGNANCY, ANTEPARTUM (HCC): ICD-10-CM

## 2024-10-08 DIAGNOSIS — O35.2XX1: ICD-10-CM

## 2024-10-08 DIAGNOSIS — F41.9 ANXIETY DISORDER AFFECTING PREGNANCY, ANTEPARTUM (HCC): ICD-10-CM

## 2024-10-08 DIAGNOSIS — O09.293 HISTORY OF OLIGOHYDRAMNIOS IN PRIOR PREGNANCY, CURRENTLY PREGNANT IN THIRD TRIMESTER (HCC): ICD-10-CM

## 2024-10-08 DIAGNOSIS — O09.523 AMA (ADVANCED MATERNAL AGE) MULTIGRAVIDA 35+, THIRD TRIMESTER (HCC): Primary | ICD-10-CM

## 2024-10-08 DIAGNOSIS — Z82.79 FAMILY HISTORY OF CONGENITAL HEART DISEASE: ICD-10-CM

## 2024-10-08 PROBLEM — O26.892 VAGINAL DISCHARGE DURING PREGNANCY IN SECOND TRIMESTER (HCC): Status: RESOLVED | Noted: 2024-08-14 | Resolved: 2024-10-08

## 2024-10-08 PROBLEM — R51.9 HEADACHE IN PREGNANCY, ANTEPARTUM, SECOND TRIMESTER (HCC): Status: RESOLVED | Noted: 2024-08-14 | Resolved: 2024-10-08

## 2024-10-08 PROBLEM — O26.892 HEADACHE IN PREGNANCY, ANTEPARTUM, SECOND TRIMESTER (HCC): Status: RESOLVED | Noted: 2024-08-14 | Resolved: 2024-10-08

## 2024-10-08 PROBLEM — N89.8 VAGINAL DISCHARGE DURING PREGNANCY IN SECOND TRIMESTER (HCC): Status: RESOLVED | Noted: 2024-08-14 | Resolved: 2024-10-08

## 2024-10-08 PROCEDURE — 3074F SYST BP LT 130 MM HG: CPT | Performed by: OBSTETRICS & GYNECOLOGY

## 2024-10-08 PROCEDURE — 3078F DIAST BP <80 MM HG: CPT | Performed by: OBSTETRICS & GYNECOLOGY

## 2024-10-08 PROCEDURE — 3008F BODY MASS INDEX DOCD: CPT | Performed by: OBSTETRICS & GYNECOLOGY

## 2024-10-08 NOTE — PROGRESS NOTES
MICHELLE visit #6     Chief Complaint   Patient presents with    Prenatal Care     MICHELLE 32w 5d  - No complaints  - Tdap and flu vaccine done      +FM. Lots of pelvic pressure. Sometimes has to slow down - sometimes can't walk because it feels very odd. Sometimes crampy. No time for PT right now.      No contractions. No leaking fluid, vaginal bleeding. Very active during the day. No headaches, vision changes, epigastric pain.     Vitals:    10/08/24 1244   BP: 110/60   Pulse: 88   Weight: 151 lb (68.5 kg)   Height: 63\"     TWG 7 lb (3.175 kg)     34 year old  at 32w5d   LAURIE 24 by LMP c/w 21w5d US (dating US not done, [place] was [place] 2 [place])   O+    GIRL  -NIPT & Carrier - neg  -Partner: Liam   -1 hr GTT, CBC done  -3rd trim HIV & syphilis done   -Tdap & flu done  -RSV vaccine - encouraged to check insurance. Information given   -COVID-19 vaccine encouraged    -pediatrician, breast pump, car seat discussed    Delivery planning  -she would like to be IOL at 39 weeks, her mom lives overseas in [facility] and she needs for her to come and watch her son when she delivers  -IOL for 39 wk - message to the  for cytotec sent. Can adjust as needed based on cervical exam.     Dating/Late care   -initial OB visit at 16w6d by LMP   -did not have early US, [place] was [place] for 2 [place]  -L2 US with MFM - size consistent with dates. Normal    FOB family history of multiple heart conditions  - brother with VSD  - sister with patent foramen ovale  - cousin who is 7 months old had heart surgery for ?  -her son has NO HEART CONDITIONS   -L2 US normal  -fetal echocardiogram per MFM - normal.     AMA   -L2 US normal  -10/3 EFW 52%. RADHA is 10.2 cm.  MVP is 4.4 cm BPP is 8/8.   -NST weekly at 36 wk     H/o oligohydramnios at 39w1d ()  -check RADHA around 36-37 wk unless otherwise advised per MFM    Headaches, 2024  -/70 at 24w6d. C/o HA daily for 2 wk  -urine P/C too low to  calculate, CBC, CMP, uric acid - ok - 9/14/24    -suspect dehydration -> improved with increased water intake    Anxiety  -meds: N  -therapist N  -BH referral - declines. Doing well.   -mental health resources provided in AVS    Overweight  -wt gain goal 15-25 lb discussed.    RTC 2 wk. Weekly NST to start at 36 wk. Needs RADHA check around 36-37 wk

## 2024-10-08 NOTE — PATIENT INSTRUCTIONS
Look up \"pelvic stability exercises\"   Pelvic/hip exercises in pregnancy     Belly band/pregnancy belly support band

## 2024-10-08 NOTE — TELEPHONE ENCOUNTER
----- Message from Augustina SALTER sent at 10/8/2024  1:34 PM CDT -----  Regarding: FW: Please schedule PM cytotec IOL for 39 wk    ----- Message -----  From: Gabriella Partida MD  Sent: 10/8/2024   1:32 PM CDT  To: [facility]  Subject: Please schedule PM cytotec IOL for 39 wk         Patient very anxious. She wants to schedule IOL for 39 wk. Her mom will be coming in from [facility]. She is hoping for exactly 39 wk. I told her Ill schedule her for cytotec PM for now, but could change to pitocin as we get closer.     Reasons for IOL AMA, history of oligohydramnios     Thanks

## 2024-10-09 ENCOUNTER — TELEPHONE (OUTPATIENT)
Facility: CLINIC | Age: 34
End: 2024-10-09

## 2024-10-09 DIAGNOSIS — O35.2XX1: ICD-10-CM

## 2024-10-09 DIAGNOSIS — Z82.79 FAMILY HISTORY OF CONGENITAL HEART DISEASE: ICD-10-CM

## 2024-10-09 DIAGNOSIS — O09.523 AMA (ADVANCED MATERNAL AGE) MULTIGRAVIDA 35+, THIRD TRIMESTER (HCC): Primary | ICD-10-CM

## 2024-10-09 DIAGNOSIS — F41.9 ANXIETY DISORDER AFFECTING PREGNANCY, ANTEPARTUM (HCC): ICD-10-CM

## 2024-10-09 DIAGNOSIS — O99.340 ANXIETY DISORDER AFFECTING PREGNANCY, ANTEPARTUM (HCC): ICD-10-CM

## 2024-10-09 DIAGNOSIS — O09.293 HISTORY OF OLIGOHYDRAMNIOS IN PRIOR PREGNANCY, CURRENTLY PREGNANT IN THIRD TRIMESTER (HCC): ICD-10-CM

## 2024-10-09 NOTE — TELEPHONE ENCOUNTER
Take precautions with herpes, cover any blister, wash your hands before contact with a baby. Can discuss further at next RETURN OB appt. Patient verbalized understanding, agreed to and intend to comply with plan of care.

## 2024-10-09 NOTE — TELEPHONE ENCOUNTER
Patient  calling on behalf of wife, he said that patient will be delivering in November and Kristyn mother is coming at that time. They are concerned because the mother had viral herpes on her shoulder. The blisters are now gone. Is it a concern for a ?

## 2024-10-24 ENCOUNTER — ROUTINE PRENATAL (OUTPATIENT)
Facility: CLINIC | Age: 34
End: 2024-10-24
Payer: COMMERCIAL

## 2024-10-24 VITALS
SYSTOLIC BLOOD PRESSURE: 100 MMHG | BODY MASS INDEX: 26.93 KG/M2 | DIASTOLIC BLOOD PRESSURE: 64 MMHG | WEIGHT: 152 LBS | HEART RATE: 89 BPM | HEIGHT: 63 IN

## 2024-10-24 DIAGNOSIS — Z87.59 HISTORY OF OLIGOHYDRAMNIOS: Primary | ICD-10-CM

## 2024-10-24 DIAGNOSIS — Z29.11 NEED FOR RSV IMMUNIZATION: ICD-10-CM

## 2024-10-24 PROCEDURE — 3008F BODY MASS INDEX DOCD: CPT

## 2024-10-24 PROCEDURE — 3074F SYST BP LT 130 MM HG: CPT

## 2024-10-24 PROCEDURE — 90471 IMMUNIZATION ADMIN: CPT

## 2024-10-24 PROCEDURE — 90678 RSV VACC PREF BIVALENT IM: CPT

## 2024-10-24 PROCEDURE — 3078F DIAST BP <80 MM HG: CPT

## 2024-10-24 NOTE — PROGRESS NOTES
Ike 35 week    She is having pelvic discomfort due to the baby being active.   -rsv discussed - given today   -US ordered in clinic for 36 weeks to check RADHA         LAURIE 11/28/24 by LMP c/w 21w5d US (dating US not done, [place] was [place] 2 [place])   O+     GIRL  -NIPT & Carrier - neg  -Partner: Liam   -1 hr GTT, CBC done  -3rd trim HIV & syphilis done   -Tdap & flu done  -RSV vaccine - encouraged to check insurance. Information given   -COVID-19 vaccine encouraged    -pediatrician, breast pump, car seat discussed    Delivery planning  -she would like to be IOL at 39 weeks, her mom lives overseas in [facility] and she needs for her to come and watch her son when she delivers  -IOL for 39 wk - message to the  for cytotec sent. Can adjust as needed based on cervical exam.      Dating/Late care   -initial OB visit at 16w6d by LMP   -did not have early US, [place] was [place] 2 [place]  -L2 US with MFM - size consistent with dates. Normal     FOB family history of multiple heart conditions  - brother with VSD  - sister with patent foramen ovale  - cousin who is 7 months old had heart surgery for ?  -her son has NO HEART CONDITIONS   -L2 US normal  -fetal echocardiogram per MFM - normal.      AMA   -L2 US normal  -10/3 EFW 52%. RADHA is 10.2 cm.  MVP is 4.4 cm BPP is 8/8.   -NST weekly at 36 wk      H/o oligohydramnios at 39w1d (2023)  -check RADHA around 36-37 wk unless otherwise advised per MFM     Headaches, 8/14/2024  -/70 at 24w6d. C/o HA daily for 2 wk  -urine P/C too low to calculate, CBC, CMP, uric acid - ok - 9/14/24    -suspect dehydration -> improved with increased water intake     Anxiety  -meds: N  -therapist N  -BH referral - declines. Doing well.   -mental health resources provided in AVS     Overweight  -wt gain goal 15-25 lb discussed.     RTC 1 wk. Weekly NST to start at 36 wk. Needs RADHA check around 36-37 wk

## 2024-11-01 ENCOUNTER — HOSPITAL ENCOUNTER (OUTPATIENT)
Age: 34
Discharge: HOME | End: 2024-11-01
Payer: COMMERCIAL

## 2024-11-01 VITALS
HEIGHT: 63 IN | OXYGEN SATURATION: 98 % | TEMPERATURE: 98 F | DIASTOLIC BLOOD PRESSURE: 65 MMHG | SYSTOLIC BLOOD PRESSURE: 101 MMHG | HEART RATE: 83 BPM | BODY MASS INDEX: 26.93 KG/M2 | WEIGHT: 152 LBS | RESPIRATION RATE: 16 BRPM

## 2024-11-01 DIAGNOSIS — S61.213A LACERATION OF LEFT MIDDLE FINGER WITHOUT FOREIGN BODY WITHOUT DAMAGE TO NAIL, INITIAL ENCOUNTER: Primary | ICD-10-CM

## 2024-11-01 PROCEDURE — 12001 RPR S/N/AX/GEN/TRNK 2.5CM/<: CPT

## 2024-11-01 PROCEDURE — 99213 OFFICE O/P EST LOW 20 MIN: CPT

## 2024-11-01 PROCEDURE — 99203 OFFICE O/P NEW LOW 30 MIN: CPT

## 2024-11-01 NOTE — ED PROVIDER NOTES
Patient Seen in: [facility]      History     Chief Complaint   Patient presents with    Laceration     Stated Complaint: laceration    Subjective:   This is a 34-year-old female who is approximately 36 weeks pregnant.  Presents to immediate care for laceration to the left middle finger.  Reports she cut her finger while cutting avocados.  Tetanus is up-to-date.  No numbness or tingling.  No treatment attempted prior to arrival.    The history is provided by the patient.             Objective:     Past Medical History:    Anxiety    Carpal tunnel syndrome during pregnancy (HCC)    Fibroids    Hereditary disease in family possibly affecting fetus, affecting management of mother, antepartum condition or complication, not applicable or unspecified fetus (HCC)    Hyperlipidemia    Overweight (BMI 25.0-29.9)    Screening for genetic disease carrier status    [employer] Carrier Screen = Negative              Past Surgical History:   Procedure Laterality Date    Enlarge breast with implant Bilateral 2008    Suct donnell lipectomy,trunk  2016                Social History     Socioeconomic History    Marital status:    Tobacco Use    Smoking status: Never     Passive exposure: Never    Smokeless tobacco: Never   Vaping Use    Vaping status: Never Used   Substance and Sexual Activity    Alcohol use: Not Currently    Drug use: Never    Sexual activity: Yes     Partners: Male     Social Drivers of Health     Financial Resource Strain: Low Risk  (2/17/2023)    Financial Resource Strain     Difficulty of Paying Living Expenses: Not hard at all     Med Affordability: No   Food Insecurity: No Food Insecurity (2/17/2023)    Food Insecurity     Food Insecurity: Never true   Transportation Needs: No Transportation Needs (2/17/2023)    Transportation Needs     Lack of Transportation: No   Stress: No Stress Concern Present (2/17/2023)    Stress     Feeling of Stress : No   Housing Stability: Low Risk  (2/17/2023)     Housing Stability     Housing Instability: No              Review of Systems   Constitutional:  Negative for chills and fever.   HENT:  Negative for congestion and sore throat.    Respiratory:  Negative for cough, shortness of breath and wheezing.    Cardiovascular:  Negative for chest pain, palpitations and leg swelling.   Musculoskeletal:  Positive for arthralgias. Negative for back pain, neck pain and neck stiffness.   Skin:  Positive for wound.   Neurological:  Negative for numbness.       Positive for stated complaint: laceration  Other systems are as noted in HPI.  Constitutional and vital signs reviewed.      All other systems reviewed and negative except as noted above.    Physical Exam     ED Triage Vitals [11/01/24 1542]   /65   Pulse 83   Resp 16   Temp 97.8 °F (36.6 °C)   Temp src Temporal   SpO2 98 %   O2 Device None (Room air)       Current Vitals:   Vital Signs  BP: 101/65  Pulse: 83  Resp: 16  Temp: 97.8 °F (36.6 °C)  Temp src: Temporal    Oxygen Therapy  SpO2: 98 %  O2 Device: None (Room air)        Physical Exam  Vitals and nursing note reviewed.   Constitutional:       General: She is not in acute distress.     Appearance: Normal appearance. She is not ill-appearing, toxic-appearing or diaphoretic.   HENT:      Head: Normocephalic and atraumatic.      Right Ear: External ear normal.      Left Ear: External ear normal.      Nose: Nose normal.      Mouth/Throat:      Mouth: Mucous membranes are moist.      Pharynx: Oropharynx is clear.   Eyes:      General:         Right eye: No discharge.         Left eye: No discharge.      Extraocular Movements: Extraocular movements intact.      Conjunctiva/sclera: Conjunctivae normal.   Cardiovascular:      Rate and Rhythm: Normal rate.   Pulmonary:      Effort: Pulmonary effort is normal.   Musculoskeletal:      Left hand: Laceration and tenderness present. No swelling, deformity or bony tenderness. Normal range of motion. Normal strength. Normal  sensation. There is no disruption of two-point discrimination. Normal capillary refill. Normal pulse.      Cervical back: Neck supple.      Right lower leg: No edema.      Left lower leg: No edema.   Skin:     General: Skin is warm and dry.      Capillary Refill: Capillary refill takes less than 2 seconds.      Findings: No rash.   Neurological:      Mental Status: She is alert and oriented to person, place, and time.   Psychiatric:         Mood and Affect: Mood normal.         Behavior: Behavior normal.             ED Course   Labs Reviewed - No data to display         Let gel, wound care, lac repair with sutures       MDM      Vital signs stable.  Patient is well-appearing and nontoxic looking.  Presents to immediate care for laceration to left middle finger.      There is no obvious tendon or bony abnormality on the finger.  There is approximately    1.5 cm laceration to the dorsal side of the proximal phalanx of the left middle finger.    Anesthesia type: let gel, 1% lidocaine without  Location: Left middle finger  Size:1.5 cm  Shape: linear  FB present:none  Cleansing: Copious amounts of 0.9 normal saline  Wound closure: Excellent with 6, 5.0 Ethilon sutures, simple interrupted  N/V intact: Yes  Tendon/Function intact: Yes  Dressing type: Antibiotic ointment and nonadherent dressing  Td: Up-to-date  Pt tolerated: Well    DC home.  Wound care including signs of infection discussed in detail.  Sutures out in 7 to 10 days.  Reasons to return reviewed.  Patient verbalized understanding, and agreed with plan of care.  All questions answered.      Medical Decision Making      Disposition and Plan     Clinical Impression:  1. Laceration of left middle finger without foreign body without damage to nail, initial encounter         Disposition:  Discharge  11/1/2024  4:51 pm    Follow-up:  PCP      As needed          Medications Prescribed:  Current Discharge Medication List              Supplementary Documentation:

## 2024-11-01 NOTE — ED INITIAL ASSESSMENT (HPI)
C/o laceration left 3rd finger with a kitchen knife taking out avocado seed around noon time. Currently 36 weeks pregnant

## 2024-11-01 NOTE — DISCHARGE INSTRUCTIONS
Keep wound clean and dry.  Over-the-counter antibiotic ointment twice a day.  Sutures out in 7 to 10 days.  PCP follow-up as needed.

## 2024-11-07 ENCOUNTER — ROUTINE PRENATAL (OUTPATIENT)
Facility: CLINIC | Age: 34
End: 2024-11-07
Payer: COMMERCIAL

## 2024-11-07 ENCOUNTER — ULTRASOUND ENCOUNTER (OUTPATIENT)
Facility: CLINIC | Age: 34
End: 2024-11-07
Payer: COMMERCIAL

## 2024-11-07 VITALS
WEIGHT: 152 LBS | HEART RATE: 84 BPM | DIASTOLIC BLOOD PRESSURE: 66 MMHG | HEIGHT: 63 IN | BODY MASS INDEX: 26.93 KG/M2 | SYSTOLIC BLOOD PRESSURE: 116 MMHG

## 2024-11-07 DIAGNOSIS — Z34.83 PRENATAL CARE, SUBSEQUENT PREGNANCY IN THIRD TRIMESTER (HCC): Primary | ICD-10-CM

## 2024-11-07 DIAGNOSIS — Z3A.37 37 WEEKS GESTATION OF PREGNANCY (HCC): ICD-10-CM

## 2024-11-07 DIAGNOSIS — O09.523 AMA (ADVANCED MATERNAL AGE) MULTIGRAVIDA 35+, THIRD TRIMESTER (HCC): ICD-10-CM

## 2024-11-07 DIAGNOSIS — Z87.59 HISTORY OF OLIGOHYDRAMNIOS: ICD-10-CM

## 2024-11-07 PROCEDURE — 3008F BODY MASS INDEX DOCD: CPT | Performed by: OBSTETRICS & GYNECOLOGY

## 2024-11-07 PROCEDURE — 3078F DIAST BP <80 MM HG: CPT | Performed by: OBSTETRICS & GYNECOLOGY

## 2024-11-07 PROCEDURE — 59025 FETAL NON-STRESS TEST: CPT | Performed by: OBSTETRICS & GYNECOLOGY

## 2024-11-07 PROCEDURE — 76815 OB US LIMITED FETUS(S): CPT | Performed by: OBSTETRICS & GYNECOLOGY

## 2024-11-07 PROCEDURE — 3074F SYST BP LT 130 MM HG: CPT | Performed by: OBSTETRICS & GYNECOLOGY

## 2024-11-07 NOTE — PROGRESS NOTES
Patient feels occasional contractions    LAURIE 11/28/24 by LMP c/w 21w5d US (dating US not done, [place] was [place] 2 [place])     GIRL  -NIPT & Carrier - neg    -1 hr GTT, CBC done  -3rd trim HIV & syphilis done   -Tdap & flu, RSV  done    -pediatrician, breast pump, car seat discussed      -IOL for 39 wk -11/21/24 cytotec  Dating/Late care   -initial OB visit at 16w6d by LMP   -did not have early US, patient was overseas for 2 months  -L2 US with MFM - size consistent with dates. Normal     FOB family history of multiple heart conditions  - brother with VSD  - sister with patent foramen ovale  - cousin who is 7 months old had heart surgery for ?  -her son has NO HEART CONDITIONS   -L2 US normal  -fetal echocardiogram per MFM - normal.      AMA   -L2 US normal  -10/3 EFW 52%. RADHA is 10.2 cm.  MVP is 4.4 cm BPP is 8/8.   -NST weekly at 36 wk     H/o oligohydramnios at 39w1d (2023)  -check RADHA around 36-37 wk unless otherwise advised per MFM  - 11/7/24  SINGLE VIABLE IUP SEEN  FHT =136 BPM  PRESENTATION - CEPHALIC  AMNIOTIC FLUID - 10.88 CM     Headaches, 8/14/2024  -/70 at 24w6d. C/o HA daily for 2 wk  -urine P/C too low to calculate, CBC, CMP, uric acid - ok - 9/14/24    -suspect dehydration -> improved with increased water intake     Anxiety  -meds: N  -therapist N  - referral - declines. Doing well.   -mental health resources provided in AVS     Overweight  -wt gain goal 15-25 lb discussed.

## 2024-11-12 ENCOUNTER — TELEPHONE (OUTPATIENT)
Dept: OBGYN UNIT | Facility: HOSPITAL | Age: 34
End: 2024-11-12

## 2024-11-14 ENCOUNTER — HOSPITAL ENCOUNTER (OUTPATIENT)
Age: 34
Discharge: HOME | End: 2024-11-14
Payer: COMMERCIAL

## 2024-11-14 ENCOUNTER — ROUTINE PRENATAL (OUTPATIENT)
Dept: OBGYN CLINIC | Facility: CLINIC | Age: 34
End: 2024-11-14
Payer: COMMERCIAL

## 2024-11-14 VITALS
BODY MASS INDEX: 27 KG/M2 | TEMPERATURE: 98 F | OXYGEN SATURATION: 99 % | SYSTOLIC BLOOD PRESSURE: 113 MMHG | DIASTOLIC BLOOD PRESSURE: 69 MMHG | WEIGHT: 151 LBS | HEART RATE: 77 BPM | RESPIRATION RATE: 16 BRPM

## 2024-11-14 VITALS
HEART RATE: 84 BPM | BODY MASS INDEX: 26.87 KG/M2 | WEIGHT: 151.63 LBS | HEIGHT: 63 IN | SYSTOLIC BLOOD PRESSURE: 105 MMHG | DIASTOLIC BLOOD PRESSURE: 68 MMHG

## 2024-11-14 DIAGNOSIS — Z87.59 HISTORY OF OLIGOHYDRAMNIOS: ICD-10-CM

## 2024-11-14 DIAGNOSIS — Z34.90 PRENATAL CARE, ANTEPARTUM (HCC): Primary | ICD-10-CM

## 2024-11-14 DIAGNOSIS — Z3A.38 38 WEEKS GESTATION OF PREGNANCY (HCC): ICD-10-CM

## 2024-11-14 DIAGNOSIS — Z48.02 ENCOUNTER FOR REMOVAL OF SUTURES: Primary | ICD-10-CM

## 2024-11-14 DIAGNOSIS — O09.523 AMA (ADVANCED MATERNAL AGE) MULTIGRAVIDA 35+, THIRD TRIMESTER (HCC): ICD-10-CM

## 2024-11-14 DIAGNOSIS — F41.9 ANXIETY DISORDER AFFECTING PREGNANCY, ANTEPARTUM (HCC): ICD-10-CM

## 2024-11-14 DIAGNOSIS — O99.340 ANXIETY DISORDER AFFECTING PREGNANCY, ANTEPARTUM (HCC): ICD-10-CM

## 2024-11-14 PROCEDURE — 59025 FETAL NON-STRESS TEST: CPT | Performed by: STUDENT IN AN ORGANIZED HEALTH CARE EDUCATION/TRAINING PROGRAM

## 2024-11-14 PROCEDURE — 3078F DIAST BP <80 MM HG: CPT | Performed by: STUDENT IN AN ORGANIZED HEALTH CARE EDUCATION/TRAINING PROGRAM

## 2024-11-14 PROCEDURE — 3008F BODY MASS INDEX DOCD: CPT | Performed by: STUDENT IN AN ORGANIZED HEALTH CARE EDUCATION/TRAINING PROGRAM

## 2024-11-14 PROCEDURE — 3074F SYST BP LT 130 MM HG: CPT | Performed by: STUDENT IN AN ORGANIZED HEALTH CARE EDUCATION/TRAINING PROGRAM

## 2024-11-14 NOTE — PROGRESS NOTES
+FM, more moisture in the vagina, sometimes feels contractions - irregular   SVE 2/75/-3     LAURIE 11/28/24 by LMP c/w 21w5d US (dating US not done, [place] was [place] 2 [place])     GIRL  -NIPT & Carrier - neg  -1 hr GTT, CBC done  -3rd trim HIV & syphilis done   -Tdap & flu, RSV  done    -pediatrician, breast pump, car seat discussed    Dating/Late care   -initial OB visit at 16w6d by LMP   -did not have early US, [place] was [place] 2 [place]  -L2 US with MFM - size consistent with dates. Normal     FOB family history of multiple heart conditions  - brother with VSD  - sister with patent foramen ovale  - cousin who is 7 months old had heart surgery for ?  -her son has NO HEART CONDITIONS   -L2 US normal  -fetal echocardiogram per MFM - normal.      AMA   -L2 US normal  -10/3 EFW 52%. RADHA is 10.2 cm.  MVP is 4.4 cm BPP is 8/8.   -NST weekly at 36 wk     H/o oligohydramnios at 39w1d (2023)  -check RADHA around 36-37 wk unless otherwise advised per MFM  - 11/7/24  SINGLE VIABLE IUP SEEN  FHT =136 BPM  PRESENTATION - CEPHALIC  AMNIOTIC FLUID - 10.88 CM     Headaches, 8/14/2024  -/70 at 24w6d. C/o HA daily for 2 wk  -urine P/C too low to calculate, CBC, CMP, uric acid - ok - 9/14/24    -suspect dehydration -> improved with increased water intake     Anxiety  -meds: N  -therapist N  - referral - declines. Doing well.   -mental health resources provided in AVS     Overweight  -wt gain goal 15-25 lb discussed.

## 2024-11-14 NOTE — ED PROVIDER NOTES
Patient Seen in: [facility]      History     Chief Complaint   Patient presents with    Suture Removal     Stated Complaint: Suture removal    Subjective:   HPI    Patient is a pleasant 34-year-old female here for suture removal.  Sutures were placed here at immediate care on 11/1/2024.  The patient has no concerns or complaints.    Objective:     Past Medical History:    Anxiety    Carpal tunnel syndrome during pregnancy (HCC)    Fibroids    Hereditary disease in family possibly affecting fetus, affecting management of mother, antepartum condition or complication, not applicable or unspecified fetus (HCC)    Hyperlipidemia    Overweight (BMI 25.0-29.9)    Screening for genetic disease carrier status    [employer] Carrier Screen = Negative              Past Surgical History:   Procedure Laterality Date    Enlarge breast with implant Bilateral 2008    Suct donnell lipectomy,trunk  2016                Social History     Socioeconomic History    Marital status:    Tobacco Use    Smoking status: Never     Passive exposure: Never    Smokeless tobacco: Never   Vaping Use    Vaping status: Never Used   Substance and Sexual Activity    Alcohol use: Not Currently    Drug use: Never    Sexual activity: Yes     Partners: Male     Social Drivers of Health     Financial Resource Strain: Low Risk  (2/17/2023)    Financial Resource Strain     Difficulty of Paying Living Expenses: Not hard at all     Med Affordability: No   Food Insecurity: No Food Insecurity (2/17/2023)    Food Insecurity     Food Insecurity: Never true   Transportation Needs: No Transportation Needs (2/17/2023)    Transportation Needs     Lack of Transportation: No   Stress: No Stress Concern Present (2/17/2023)    Stress     Feeling of Stress : No   Housing Stability: Low Risk  (2/17/2023)    Housing Stability     Housing Instability: No              Review of Systems    Positive for stated complaint: Suture removal  Other systems are as  noted in HPI.  Constitutional and vital signs reviewed.      All other systems reviewed and negative except as noted above.    Physical Exam     ED Triage Vitals [11/14/24 1406]   /69   Pulse 77   Resp 16   Temp 97.9 °F (36.6 °C)   Temp src    SpO2 99 %   O2 Device None (Room air)       Current Vitals:   Vital Signs  BP: 113/69  Pulse: 77  Resp: 16  Temp: 97.9 °F (36.6 °C)    Oxygen Therapy  SpO2: 99 %  O2 Device: None (Room air)        Physical Exam  Vitals and nursing note reviewed.   Constitutional:       Appearance: Normal appearance.   Eyes:      Conjunctiva/sclera: Conjunctivae normal.      Pupils: Pupils are equal, round, and reactive to light.   Cardiovascular:      Rate and Rhythm: Normal rate.      Pulses: Normal pulses.   Pulmonary:      Effort: Pulmonary effort is normal.   Musculoskeletal:      Left hand: Normal strength. Normal sensation. There is no disruption of two-point discrimination. Normal capillary refill. Normal pulse.      Comments: Laceration-edges well-approximated.   Neurological:      Mental Status: She is alert.           ED Course   Labs Reviewed - No data to display  Area cleansed with isopropyl alcohol.  6 sutures removed without difficulty.  Patient tolerated procedure well.  Topical antibiotic ointment and Band-Aid applied.          MDM          Medical Decision Making  See suture removal note.  Tolerated procedure well.  Monitoring parameters and follow-up precautions reviewed with patient who agrees with plan of care.  All questions answered patient's satisfaction.        Disposition and Plan     Clinical Impression:  1. Encounter for removal of sutures         Disposition:  Discharge  11/14/2024  2:15 pm    Follow-up:  No follow-up provider specified.        Medications Prescribed:  Current Discharge Medication List              Supplementary Documentation:

## 2024-11-18 ENCOUNTER — HOSPITAL ENCOUNTER (INPATIENT)
Facility: HOSPITAL | Age: 34
LOS: 1 days | Discharge: HOME | End: 2024-11-19
Attending: OBSTETRICS & GYNECOLOGY | Admitting: OBSTETRICS & GYNECOLOGY
Payer: COMMERCIAL

## 2024-11-18 ENCOUNTER — ANESTHESIA (OUTPATIENT)
Dept: OBGYN UNIT | Facility: HOSPITAL | Age: 34
End: 2024-11-18
Payer: COMMERCIAL

## 2024-11-18 ENCOUNTER — ANESTHESIA EVENT (OUTPATIENT)
Dept: OBGYN UNIT | Facility: HOSPITAL | Age: 34
End: 2024-11-18
Payer: COMMERCIAL

## 2024-11-18 PROBLEM — Z34.90 PREGNANCY (HCC): Status: ACTIVE | Noted: 2024-11-18

## 2024-11-18 PROBLEM — O47.9 UTERINE CONTRACTIONS (HCC): Status: ACTIVE | Noted: 2024-11-18

## 2024-11-18 LAB
ANTIBODY SCREEN: NEGATIVE
BASOPHILS # BLD AUTO: 0.02 X10(3) UL (ref 0–0.2)
BASOPHILS NFR BLD AUTO: 0.3 %
EOSINOPHIL # BLD AUTO: 0.01 X10(3) UL (ref 0–0.7)
EOSINOPHIL NFR BLD AUTO: 0.1 %
ERYTHROCYTE [DISTWIDTH] IN BLOOD BY AUTOMATED COUNT: 12.7 %
HCT VFR BLD AUTO: 39.6 %
HGB BLD-MCNC: 13.9 G/DL
IMM GRANULOCYTES # BLD AUTO: 0.01 X10(3) UL (ref 0–1)
IMM GRANULOCYTES NFR BLD: 0.1 %
LYMPHOCYTES # BLD AUTO: 2.72 X10(3) UL (ref 1–4)
LYMPHOCYTES NFR BLD AUTO: 38.6 %
MCH RBC QN AUTO: 32 PG (ref 26–34)
MCHC RBC AUTO-ENTMCNC: 35.1 G/DL (ref 31–37)
MCV RBC AUTO: 91.2 FL
MONOCYTES # BLD AUTO: 0.35 X10(3) UL (ref 0.1–1)
MONOCYTES NFR BLD AUTO: 5 %
NEUTROPHILS # BLD AUTO: 3.93 X10 (3) UL (ref 1.5–7.7)
NEUTROPHILS # BLD AUTO: 3.93 X10(3) UL (ref 1.5–7.7)
NEUTROPHILS NFR BLD AUTO: 55.9 %
PLATELET # BLD AUTO: 207 10(3)UL (ref 150–450)
RBC # BLD AUTO: 4.34 X10(6)UL
RH BLOOD TYPE: POSITIVE
T PALLIDUM AB SER QL IA: NONREACTIVE
WBC # BLD AUTO: 7 X10(3) UL (ref 4–11)

## 2024-11-18 PROCEDURE — 0HQ9XZZ REPAIR PERINEUM SKIN, EXTERNAL APPROACH: ICD-10-PCS | Performed by: OBSTETRICS & GYNECOLOGY

## 2024-11-18 PROCEDURE — 59400 OBSTETRICAL CARE: CPT | Performed by: OBSTETRICS & GYNECOLOGY

## 2024-11-18 RX ORDER — NALBUPHINE HYDROCHLORIDE 10 MG/ML
2.5 INJECTION INTRAMUSCULAR; INTRAVENOUS; SUBCUTANEOUS
Status: DISCONTINUED | OUTPATIENT
Start: 2024-11-18 | End: 2024-11-18

## 2024-11-18 RX ORDER — BISACODYL 10 MG
10 SUPPOSITORY, RECTAL RECTAL ONCE AS NEEDED
Status: DISCONTINUED | OUTPATIENT
Start: 2024-11-18 | End: 2024-11-19

## 2024-11-18 RX ORDER — BUPIVACAINE HCL/0.9 % NACL/PF 0.25 %
PLASTIC BAG, INJECTION (ML) EPIDURAL
Status: COMPLETED
Start: 2024-11-18 | End: 2024-11-18

## 2024-11-18 RX ORDER — ACETAMINOPHEN 500 MG
500 TABLET ORAL EVERY 6 HOURS PRN
Status: DISCONTINUED | OUTPATIENT
Start: 2024-11-18 | End: 2024-11-18

## 2024-11-18 RX ORDER — SIMETHICONE 80 MG
80 TABLET,CHEWABLE ORAL 3 TIMES DAILY PRN
Status: DISCONTINUED | OUTPATIENT
Start: 2024-11-18 | End: 2024-11-19

## 2024-11-18 RX ORDER — LIDOCAINE HYDROCHLORIDE 20 MG/ML
5 INJECTION, SOLUTION EPIDURAL; INFILTRATION; INTRACAUDAL; PERINEURAL AS NEEDED
Status: DISCONTINUED | OUTPATIENT
Start: 2024-11-18 | End: 2024-11-18

## 2024-11-18 RX ORDER — AMPICILLIN 2 G/1
INJECTION, POWDER, FOR SOLUTION INTRAVENOUS
Status: DISPENSED
Start: 2024-11-18 | End: 2024-11-18

## 2024-11-18 RX ORDER — LIDOCAINE HYDROCHLORIDE AND EPINEPHRINE 15; 5 MG/ML; UG/ML
INJECTION, SOLUTION EPIDURAL AS NEEDED
Status: DISCONTINUED | OUTPATIENT
Start: 2024-11-18 | End: 2024-11-18 | Stop reason: SURG

## 2024-11-18 RX ORDER — DEXTROSE, SODIUM CHLORIDE, SODIUM LACTATE, POTASSIUM CHLORIDE, AND CALCIUM CHLORIDE 5; .6; .31; .03; .02 G/100ML; G/100ML; G/100ML; G/100ML; G/100ML
INJECTION, SOLUTION INTRAVENOUS AS NEEDED
Status: DISCONTINUED | OUTPATIENT
Start: 2024-11-18 | End: 2024-11-18

## 2024-11-18 RX ORDER — ACETAMINOPHEN 500 MG
500 TABLET ORAL EVERY 6 HOURS PRN
Status: DISCONTINUED | OUTPATIENT
Start: 2024-11-18 | End: 2024-11-19

## 2024-11-18 RX ORDER — ACETAMINOPHEN 500 MG
1000 TABLET ORAL EVERY 6 HOURS PRN
Status: DISCONTINUED | OUTPATIENT
Start: 2024-11-18 | End: 2024-11-18

## 2024-11-18 RX ORDER — BUPIVACAINE HCL/0.9 % NACL/PF 0.25 %
5 PLASTIC BAG, INJECTION (ML) EPIDURAL AS NEEDED
Status: DISCONTINUED | OUTPATIENT
Start: 2024-11-18 | End: 2024-11-18

## 2024-11-18 RX ORDER — SODIUM CHLORIDE 9 MG/ML
INJECTION, SOLUTION INTRAMUSCULAR; INTRAVENOUS; SUBCUTANEOUS
Status: DISPENSED
Start: 2024-11-18 | End: 2024-11-18

## 2024-11-18 RX ORDER — TERBUTALINE SULFATE 1 MG/ML
0.25 INJECTION, SOLUTION SUBCUTANEOUS AS NEEDED
Status: DISCONTINUED | OUTPATIENT
Start: 2024-11-18 | End: 2024-11-18

## 2024-11-18 RX ORDER — DOCUSATE SODIUM 100 MG/1
100 CAPSULE, LIQUID FILLED ORAL
Status: DISCONTINUED | OUTPATIENT
Start: 2024-11-18 | End: 2024-11-19

## 2024-11-18 RX ORDER — IBUPROFEN 600 MG/1
600 TABLET, FILM COATED ORAL ONCE AS NEEDED
Status: DISCONTINUED | OUTPATIENT
Start: 2024-11-18 | End: 2024-11-18

## 2024-11-18 RX ORDER — LIDOCAINE HYDROCHLORIDE AND EPINEPHRINE 15; 5 MG/ML; UG/ML
5 INJECTION, SOLUTION EPIDURAL AS NEEDED
Status: DISCONTINUED | OUTPATIENT
Start: 2024-11-18 | End: 2024-11-18

## 2024-11-18 RX ORDER — 0.9 % SODIUM CHLORIDE 0.9 %
INTRAVENOUS SOLUTION INTRAVENOUS
Status: DISCONTINUED
Start: 2024-11-18 | End: 2024-11-18 | Stop reason: WASHOUT

## 2024-11-18 RX ORDER — IBUPROFEN 600 MG/1
600 TABLET, FILM COATED ORAL EVERY 6 HOURS
Status: DISCONTINUED | OUTPATIENT
Start: 2024-11-18 | End: 2024-11-19

## 2024-11-18 RX ORDER — SODIUM CHLORIDE 9 MG/ML
10 INJECTION, SOLUTION INTRAMUSCULAR; INTRAVENOUS; SUBCUTANEOUS AS NEEDED
Status: DISCONTINUED | OUTPATIENT
Start: 2024-11-18 | End: 2024-11-18

## 2024-11-18 RX ORDER — SODIUM CHLORIDE, SODIUM LACTATE, POTASSIUM CHLORIDE, CALCIUM CHLORIDE 600; 310; 30; 20 MG/100ML; MG/100ML; MG/100ML; MG/100ML
INJECTION, SOLUTION INTRAVENOUS CONTINUOUS
Status: DISCONTINUED | OUTPATIENT
Start: 2024-11-18 | End: 2024-11-18

## 2024-11-18 RX ORDER — ONDANSETRON 2 MG/ML
4 INJECTION INTRAMUSCULAR; INTRAVENOUS EVERY 6 HOURS PRN
Status: DISCONTINUED | OUTPATIENT
Start: 2024-11-18 | End: 2024-11-18

## 2024-11-18 RX ORDER — BUPIVACAINE HYDROCHLORIDE 2.5 MG/ML
30 INJECTION, SOLUTION EPIDURAL; INFILTRATION; INTRACAUDAL AS NEEDED
Status: DISCONTINUED | OUTPATIENT
Start: 2024-11-18 | End: 2024-11-18

## 2024-11-18 RX ORDER — ACETAMINOPHEN 500 MG
1000 TABLET ORAL EVERY 6 HOURS PRN
Status: DISCONTINUED | OUTPATIENT
Start: 2024-11-18 | End: 2024-11-19

## 2024-11-18 RX ORDER — CITRIC ACID/SODIUM CITRATE 334-500MG
30 SOLUTION, ORAL ORAL AS NEEDED
Status: DISCONTINUED | OUTPATIENT
Start: 2024-11-18 | End: 2024-11-18

## 2024-11-18 RX ORDER — AMMONIA INHALANTS 0.04 G/.3ML
0.3 INHALANT RESPIRATORY (INHALATION) AS NEEDED
Status: DISCONTINUED | OUTPATIENT
Start: 2024-11-18 | End: 2024-11-19

## 2024-11-18 RX ADMIN — LIDOCAINE HYDROCHLORIDE AND EPINEPHRINE 5 ML: 15; 5 INJECTION, SOLUTION EPIDURAL at 01:58:00

## 2024-11-18 NOTE — PLAN OF CARE
Problem: BIRTH - VAGINAL/ SECTION  Goal: Fetal and maternal status remain reassuring during the birth process  Description: INTERVENTIONS:  - Monitor vital signs  - Monitor fetal heart rate  - Monitor uterine activity  - Monitor labor progression (vaginal delivery)  - DVT prophylaxis (C/S delivery)  - Surgical antibiotic prophylaxis (C/S delivery)  Outcome: Progressing     Problem: PAIN - ADULT  Goal: Verbalizes/displays adequate comfort level or patient's stated pain goal  Description: INTERVENTIONS:  - Encourage pt to monitor pain and request assistance  - Assess pain using appropriate pain scale  - Administer analgesics based on type and severity of pain and evaluate response  - Implement non-pharmacological measures as appropriate and evaluate response  - Consider cultural and social influences on pain and pain management  - Manage/alleviate anxiety  - Utilize distraction and/or relaxation techniques  - Monitor for opioid side effects  - Notify MD/LIP if interventions unsuccessful or patient reports new pain  - Anticipate increased pain with activity and pre-medicate as appropriate  Outcome: Progressing     Problem: ANXIETY  Goal: Will report anxiety at manageable levels  Description: INTERVENTIONS:  - Administer medication as ordered  - Teach and rehearse alternative coping skills  - Provide emotional support with 1:1 interaction with staff  Outcome: Progressing     Problem: Patient/Family Goals  Goal: Patient/Family Long Term Goal  Description: Patient's Long Term Goal: uncomplicated delivery    Interventions:  - VS per protocol  I&O  Ice chips and sips as tolerated  EFM per protocol  Maintain IV as ordered  Antibiotics as needed per protocol  Informed consent      - See additional Care Plan goals for specific interventions  Outcome: Progressing  Goal: Patient/Family Short Term Goal  Description: Patient's Short Term Goal: pain relief from contractions    Interventions:   - epidural  - See additional  Care Plan goals for specific interventions  Outcome: Progressing

## 2024-11-18 NOTE — PROGRESS NOTES
Pt is a  at 38.4 weeks in triage for r/o labor. Pt states contractions started around 2300. Pt denies leaking of fluid or vaginal bleeding. Pt denies any complications during last pregnancy or this pregnancy. Father in law support person,  is enroute. Efm tested and applied.

## 2024-11-18 NOTE — PLAN OF CARE
Problem: POSTPARTUM  Goal: Long Term Goal:Experiences normal postpartum course  Description: INTERVENTIONS:  - Assess and monitor vital signs and lab values.  - Assess fundus and lochia.  - Provide ice/sitz baths for perineum discomfort.  - Monitor healing of incision/episiotomy/laceration, and assess for signs and symptoms of infection and hematoma.  - Assess bladder function and monitor for bladder distention.  - Provide/instruct/assist with pericare as needed.  - Provide VTE prophylaxis as needed.  - Monitor bowel function.  - Encourage ambulation and provide assistance as needed.  - Assess and monitor emotional status and provide social service/psych resources as needed.  - Utilize standard precautions and use personal protective equipment as indicated. Ensure aseptic care of all intravenous lines and invasive tubes/drains.  - Obtain immunization and exposure to communicable diseases history.  11/18/2024 1740 by Daphnie Colon RN  Outcome: Progressing  11/18/2024 0916 by Daphnie Colon RN  Outcome: Progressing  Goal: Optimize infant feeding at the breast  Description: INTERVENTIONS:  - Initiate breast feeding within first hour after birth.   - Monitor effectiveness of current breast feeding efforts.  - Assess support systems available to mother/family.  - Identify cultural beliefs/practices regarding lactation, letdown techniques, maternal food preferences.  - Assess mother's knowledge and previous experience with breast feeding.  - Provide information as needed about early infant feeding cues (e.g., rooting, lip smacking, sucking fingers/hand) versus late cue of crying.  - Discuss/demonstrate breast feeding aids (e.g., infant sling, nursing footstool/pillows, and breast pumps).  - Encourage mother/other family members to express feelings/concerns, and actively listen.  - Educate father/SO about benefits of breast feeding and how to manage common lactation challenges.  - Recommend avoidance  of specific medications or substances incompatible with breast feeding.  - Assess and monitor for signs of nipple pain/trauma.  - Instruct and provide assistance with proper latch.  - Review techniques for milk expression (breast pumping) and storage of breast milk. Provide pumping equipment/supplies, instructions and assistance, as needed.  - Encourage rooming-in and breast feeding on demand.  - Encourage skin-to-skin contact.  - Provide LC support as needed.  - Assess for and manage engorgement.  - Provide breast feeding education handouts and information on community breast feeding support.   2024 by Daphnie Colon RN  Outcome: Progressing  2024 by Daphnie Colon RN  Outcome: Progressing  Goal: Establishment of adequate milk supply with medication/procedure interruptions  Description: INTERVENTIONS:  - Review techniques for milk expression (breast pumping).   - Provide pumping equipment/supplies, instructions, and assistance until it is safe to breastfeed infant.  2024 by Daphnie Colon RN  Outcome: Progressing  2024 by Daphnie Colon RN  Outcome: Progressing  Goal: Appropriate maternal -  bonding  Description: INTERVENTIONS:  - Assess caregiver- interactions.  - Assess caregiver's emotional status and coping mechanisms.  - Encourage caregiver to participate in  daily care.  - Assess support systems available to mother/family.  - Provide /case management support as needed.  2024 by Daphnie Colon RN  Outcome: Progressing  2024 by Daphnie Colon RN  Outcome: Progressing

## 2024-11-18 NOTE — L&D DELIVERY NOTE
Anthony Casas, Girl [[id number]]      Labor Events     labor?: No   steroids?: None  Antibiotics received during labor?: Yes  Antibiotics (enter # doses in comment): ampicillin  Rupture date/time: 2024     Rupture type: SROM  Fluid color: Clear  Labor type: Spontaneous Onset of Labor  Augmentation: None  Intrapartum & labor complications: Other - see comments  Intrapartum & labor complications comment: Unknown GBS status       Labor Event Times    Labor onset date/time: 2024 230  Dilation complete date/time: 2024  Start pushing date/time: 2024        Presentation    Presentation: Vertex  Position: Occiput Anterior       Operative Delivery    Operative Vaginal Delivery: No                Shoulder Dystocia    Shoulder Dystocia: No       Anesthesia    Method: Epidural              [place] Delivery      Head delivery date/time: 2024 06:01:01   Delivery date/time:  24 06:01:07   Delivery type: Normal spontaneous vaginal delivery    Details:     Delivery location: delivery room       Delivery Providers    Delivering Clinician: aKren Gold DO   Delivery personnel:  Provider Role   Valencia Restrepo, RN Baby Nurse   Cara Kaye RN Delivery Nurse             Cord    Vessels: 3 Vessels  Complications: None  Timed cord clamping: Yes  Time in sec: 30  Cord blood disposition: to lab  Gases sent?: No       Resuscitation    Method: None        Measurements        Length: 49.5 cm               Placenta    Date/time: 202403  Removal: Spontaneous  Appearance: Intact  Disposition: held for future pathology       Apgars    Living status: Living   Apgar Scoring Key:    0 1 2    Skin color Blue or pale Acrocyanotic Completely pink    Heart rate Absent <100 bpm >100 bpm    Reflex irritability No response Grimace Cry or active withdrawal    Muscle tone Limp Some flexion Active motion    Respiratory effort Absent Weak cry;  hypoventilation Good, crying              1 Minute:  5 Minute:  10 Minute:  15 Minute:  20 Minute:      Skin color: 1  1       Heart rate: 2  2       Reflex irritablity: 2  2       Muscle tone: 2  2       Respiratory effort: 2  2       Total: 9  9          Apgars assigned by: BRIONNA MCLAUGHLIN   disposition: with mother       Skin to Skin    No data filed       Vaginal Count    Initial count RN: Cara Kaye RN  Initial count Tech: Draguniene, Ausra   Sponges   Sharps    Initial counts 11   0    Final counts 11   1    Final count RN: Cara Kaye RN  Final count MD: Karen Gold DO       Lacerations    Episiotomy: None  Perineal lacerations: 1st Repaired?: Yes     Vaginal laceration?: No      Cervical laceration?: No    Clitoral laceration?: No    Quantitative blood loss (mL): 104            34 y.o.  at 38w4d with  female infant, APGARs 9/9, weight 7lb2oz. Body and shoulders easily delivered, bulb suctioned. Cord clamped x2 and cut after 30 seconds. Placenta delivered spontaneously, intact. 1st degree perineal laceration repaired with  3-0 chromic. Good hemostasis.

## 2024-11-18 NOTE — PLAN OF CARE
Problem: BIRTH - VAGINAL/ SECTION  Goal: Fetal and maternal status remain reassuring during the birth process  Description: INTERVENTIONS:  - Monitor vital signs  - Monitor fetal heart rate  - Monitor uterine activity  - Monitor labor progression (vaginal delivery)  - DVT prophylaxis (C/S delivery)  - Surgical antibiotic prophylaxis (C/S delivery)  Outcome: Completed     Problem: PAIN - ADULT  Goal: Verbalizes/displays adequate comfort level or patient's stated pain goal  Description: INTERVENTIONS:  - Encourage pt to monitor pain and request assistance  - Assess pain using appropriate pain scale  - Administer analgesics based on type and severity of pain and evaluate response  - Implement non-pharmacological measures as appropriate and evaluate response  - Consider cultural and social influences on pain and pain management  - Manage/alleviate anxiety  - Utilize distraction and/or relaxation techniques  - Monitor for opioid side effects  - Notify MD/LIP if interventions unsuccessful or patient reports new pain  - Anticipate increased pain with activity and pre-medicate as appropriate  Outcome: Completed     Problem: ANXIETY  Goal: Will report anxiety at manageable levels  Description: INTERVENTIONS:  - Administer medication as ordered  - Teach and rehearse alternative coping skills  - Provide emotional support with 1:1 interaction with staff  Outcome: Completed     Problem: Patient/Family Goals  Goal: Patient/Family Long Term Goal  Description: Patient's Long Term Goal: uncomplicated delivery    Interventions:  - VS per protocol  I&O  Ice chips and sips as tolerated  EFM per protocol  Maintain IV as ordered  Antibiotics as needed per protocol  Informed consent      - See additional Care Plan goals for specific interventions  Outcome: Completed  Goal: Patient/Family Short Term Goal  Description: Patient's Short Term Goal: pain relief from contractions    Interventions:   - epidural  - See additional Care  Plan goals for specific interventions  Outcome: Completed

## 2024-11-18 NOTE — PROGRESS NOTES
Pt is a 34 year old female admitted to 113/113-A.     Chief Complaint   Patient presents with    R/o Labor      Pt is  38w4d intra-uterine pregnancy.  History obtained, consents signed. Oriented to room, staff, and plan of care.     EFM tested and applied. Abdomen soft and non-tender.

## 2024-11-18 NOTE — ANESTHESIA PROCEDURE NOTES
Labor Analgesia    Date/Time: 11/18/2024 1:51 AM    Performed by: Jeff Villanueva DO  Authorized by: Jeff Villanueva DO      General Information and Staff    Start Time:  11/18/2024 1:51 AM  End Time:  11/18/2024 1:58 AM  Anesthesiologist:  Jeff Villanueva DO  Performed by:  Anesthesiologist  Patient Location:  OB  Site Identification: surface landmarks    Reason for Block: labor epidural    Preanesthetic Checklist: patient identified, IV checked, risks and benefits discussed, monitors and equipment checked, pre-op evaluation, timeout performed, IV bolus, anesthesia consent and sterile technique used      Procedure Details    Patient Position:  Sitting  Prep: ChloraPrep    Monitoring:  Heart rate and continuous pulse ox  Approach:  Midline    Epidural Needle    Injection Technique:  THUAN saline  Needle Type:  Tuohy  Needle Gauge:  17 G  Needle Length:  3.375 in  Needle Insertion Depth:  5  Location:  L3-4    Spinal Needle      Catheter    Catheter Type:  End hole  Catheter Size:  19 G  Catheter at Skin Depth:  10  Test Dose:  Negative    Assessment      Additional Comments

## 2024-11-18 NOTE — PROGRESS NOTES
MOM ADMISSION NOTE:  Report received and ID Bands checked & verified with: Elder  Patient admitted to room in stable condition via: wheelchair    Oriented to room, safety precautions initiated, bed in low position, and call light in reach.   Plan of care and safety instructions reviewed, teaching sheets at bedside. VS and assessment initiated.

## 2024-11-18 NOTE — H&P
[facility]  History & Physical    Kristyn Casas Patient Status:  Inpatient    1990 MRN [id number]   Location [facility] LABOR & DELIVERY Attending Karen Gold,    Hosp Day # 0 PCP None Pcp     SUBJECTIVE:    Kristyn Casas is a 34 year old  female with EDC 24 at 38 and 4/7 weeks gestation who is being admitted for labor management.  Patient reports contractions since last night  .   Fetal Movement: normal.     Obstetric History:   OB History    Para Term  AB Living   3 1 1   1 1   SAB IAB Ectopic Multiple Live Births         0 1      # Outcome Date GA Lbr Lb/2nd Weight Sex Type Anes PTL Lv   3 Current            2 Term 23 39w1d 04:30 / 02:46 7 lb 1.9 oz (3.23 kg) M NORMAL SPONT EPI N CATHERINE      Complications: Oligohydramnios   1 2019              Obstetric Comments   23 - IOL at 39w1d due to oligohydramnios. H/o COVID-19 during that pregnancy. Anxiety. Fhx congenital heart disease in multiple relatives of the father of the baby.          FHX: Maternal uncle with congenital syndrome and Down Syndrome, FOB sister with possible patent foramen ovale, FOB brother with ? VSD & he had a heart attack at 16 -? Aneurysm of cardiac vessel. FOB sister with a pacemaker (? Reason?). Aunt with hypertrophic cardiomyopathy.     Past Medical History:   Past Medical History:    Anxiety    Carpal tunnel syndrome during pregnancy (HCC)    Fibroids    Hereditary disease in family possibly affecting fetus, affecting management of mother, antepartum condition or complication, not applicable or unspecified fetus (HCC)    Hyperlipidemia    Overweight (BMI 25.0-29.9)    Screening for genetic disease carrier status    [employer] Carrier Screen = Negative     Past Social History:   Past Surgical History:   Procedure Laterality Date    Enlarge breast with implant Bilateral     Suct donnell lipectomy,trunk  2016     Family History:   Family History   Problem Relation  Age of Onset    Lipids Father         unknown    Hypertension Father         unknown    Prostate Cancer Maternal Grandfather     Colon Cancer Maternal Grandfather     Hypertension Paternal Grandmother     Lipids Paternal Grandfather     No Known Problems Son      Social History:   Social History     Tobacco Use    Smoking status: Never     Passive exposure: Never    Smokeless tobacco: Never   Substance Use Topics    Alcohol use: Not Currently       Home Meds: Prescriptions Prior to Admission[1]  Allergies: Allergies[2]    OBJECTIVE:    Temp:  [97.9 °F (36.6 °C)-98 °F (36.7 °C)] 97.9 °F (36.6 °C)  Pulse:  [] 89  BP: ()/(43-70) 102/60  SpO2:  [98 %] 98 %    Lungs:   clear to auscultation bilaterally   Heart:   regular rate and rhythm, S1, S2 normal, no murmur, click, rub or gallop   Abdomen: FHT present   Fetal Surveillance:  145 BPM   Fetal heart variability: moderate      Cervix: dilation 5 cm on admission     Lab Review:  O, Rh+, Rubella-immune, Hepatitis B surface antigen non-reactive  Recent Labs   Lab 11/18/24  0135   RBC 4.34   HGB 13.9   HCT 39.6   MCV 91.2   MCH 32.0   MCHC 35.1   RDW 12.7   NEPRELIM 3.93   WBC 7.0   .0            ASSESSMENT:    38 and 4/7 weeks gestation.  Active phase labor.      PLAN:    Risks, benefits, alternatives and possible complications have been discussed in detail with the patient.  Pre-admission, admission, and post admission procedures and expectations were discussed in detail.  All questions answered, all appropriate consents will be signed at the Hospital. Admission is planned for today.   Intervention: expectant management.    Karen Gold DO  11/18/2024  6:18 AM       [1]   Medications Prior to Admission   Medication Sig Dispense Refill Last Dose/Taking    Prenatal Vit-Fe Fumarate-FA (PRENATAL VITAMIN PLUS LOW IRON) 27-1 MG Oral Tab Take 1 tablet by mouth daily.   11/18/2024 Morning    Docusate Sodium (COLACE OR) Take by mouth. (Patient not taking:  Reported on 11/14/2024)       clobetasol 0.05 % External Ointment Apply 1 Application topically 2 (two) times daily.      [2] No Known Allergies

## 2024-11-18 NOTE — ANESTHESIA PREPROCEDURE EVALUATION
PRE-OP EVALUATION    Patient Name: Kristyn Casas    Admit Diagnosis: pregnancy  Pregnancy (HCC)    Pre-op Diagnosis: * No surgery found *        Anesthesia Procedure: LABOR ANALGESIA    * Surgery not found *    Pre-op vitals reviewed.  Temp: 98 °F (36.7 °C)  Pulse: 69  BP: 113/63     Body mass index is 26.75 kg/m².    Current medications reviewed.  Hospital Medications:   lactated ringers infusion   Intravenous Continuous    dextrose in lactated ringers 5% infusion   Intravenous PRN    lactated ringers IV bolus 500 mL  500 mL Intravenous PRN    acetaminophen (Tylenol Extra Strength) tab 500 mg  500 mg Oral Q6H PRN    acetaminophen (Tylenol Extra Strength) tab 1,000 mg  1,000 mg Oral Q6H PRN    ibuprofen (Motrin) tab 600 mg  600 mg Oral Once PRN    ondansetron (Zofran) 4 MG/2ML injection 4 mg  4 mg Intravenous Q6H PRN    oxyTOCIN in sodium chloride 0.9% (Pitocin) 30 Units/500mL infusion premix  62.5-900 nori-units/min Intravenous Continuous    terbutaline (Brethine) 1 MG/ML injection 0.25 mg  0.25 mg Subcutaneous PRN    sodium citrate-citric acid (Bicitra) 500-334 MG/5ML oral solution 30 mL  30 mL Oral PRN    ampicillin (Omnipen) 2 g in sodium chloride 0.9% 100 mL IVPB-MBP  2 g Intravenous Once    Followed by    ampicillin (Omnipen) 1 g in sodium chloride 0.9% 100 mL IVPB-MBP  1 g Intravenous Q4H    lactated ringers IV bolus 1,000 mL  1,000 mL Intravenous Once    fentaNYL-bupivacaine 2 mcg/mL-0.125% in sodium chloride 0.9% 100 mL EPIDURAL infusion premix  12 mL/hr Epidural Continuous    fentaNYL (Sublimaze) 50 mcg/mL injection 100 mcg  100 mcg Epidural Once    lidocaine 1.5%-EPINEPHrine 1:200,000 (Xylocaine-Epinephrine) injection  5 mL Injection PRN    bupivacaine PF (Marcaine) 0.25% injection  30 mL Injection PRN    lidocaine PF (Xylocaine-MPF) 2% injection  5 mL Injection PRN    sodium chloride 0.9% PF injection 10 mL  10 mL Injection PRN    EPHEDrine (PF) 25 MG/5 ML injection 5 mg  5 mg Intravenous PRN     nalbuphine (Nubain) 10 mg/mL injection 2.5 mg  2.5 mg Intravenous Q15 Min PRN    fentaNYL-bupivacaine in sodium chloride 0.9% 2 mcg/mL-0.125% EPIDURAL infusion premix        sodium chloride 0.9% PF 0.9% injection        ampicillin (Omnipen) 2 g injection        fentaNYL (Sublimaze) 50 mcg/mL injection        EPHEDrine (PF) 25 MG/5 ML injection        sodium chloride 0.9% IVPB        oxyTOCIN in sodium chloride 0.9% (Pitocin) 30 Units/500mL infusion premix           Outpatient Medications:   Prescriptions Prior to Admission[1]    Allergies: Patient has no known allergies.      Anesthesia Evaluation    Patient summary reviewed.    Anesthetic Complications  (-) history of anesthetic complications         GI/Hepatic/Renal    Negative GI/hepatic/renal ROS.                             Cardiovascular    Negative cardiovascular ROS.    Exercise tolerance: good     MET: >4                                           Endo/Other    Negative endo/other ROS.                              Pulmonary    Negative pulmonary ROS.                       Neuro/Psych    Negative neuro/psych ROS.                                  Past Surgical History:   Procedure Laterality Date    Enlarge breast with implant Bilateral 2008    Suct donnell lipectomy,trunk  2016     Social History     Socioeconomic History    Marital status:    Tobacco Use    Smoking status: Never     Passive exposure: Never    Smokeless tobacco: Never   Vaping Use    Vaping status: Never Used   Substance and Sexual Activity    Alcohol use: Not Currently    Drug use: Never    Sexual activity: Yes     Partners: Male     History   Drug Use Unknown     Available pre-op labs reviewed.  Lab Results   Component Value Date    WBC 7.0 11/18/2024    RBC 4.34 11/18/2024    HGB 13.9 11/18/2024    HCT 39.6 11/18/2024    MCV 91.2 11/18/2024    MCH 32.0 11/18/2024    MCHC 35.1 11/18/2024    RDW 12.7 11/18/2024    .0 11/18/2024     Lab Results   Component Value Date      09/14/2024    K 3.6 09/14/2024     09/14/2024    CO2 23.0 09/14/2024    BUN 8 (L) 09/14/2024    CREATSERUM 0.57 09/14/2024     (H) 09/14/2024    CA 8.9 09/14/2024            Airway      Mallampati: II  Mouth opening: >3 FB  TM distance: 4 - 6 cm  Neck ROM: full Cardiovascular      Rhythm: regular  Rate: normal     Dental    Dentition appears grossly intact         Pulmonary      Breath sounds clear to auscultation bilaterally.               Other findings              ASA: 2   Plan: epidural  NPO status verified and           Plan/risks discussed with: patient (Risks of epidural discussed including bleeding, infection and nerve damage.)                Present on Admission:  **None**             [1]   Medications Prior to Admission   Medication Sig Dispense Refill Last Dose/Taking    Prenatal Vit-Fe Fumarate-FA (PRENATAL VITAMIN PLUS LOW IRON) 27-1 MG Oral Tab Take 1 tablet by mouth daily.   11/18/2024 Morning    Docusate Sodium (COLACE OR) Take by mouth. (Patient not taking: Reported on 11/14/2024)       clobetasol 0.05 % External Ointment Apply 1 Application topically 2 (two) times daily.

## 2024-11-18 NOTE — PROGRESS NOTES
Patient up to bathroom with assist x 2.  Voided 950 ml. Patient transferred to mother/baby room 2211 per wheelchair in stable condition with baby and personal belongings.  Accompanied by significant other and staff.  Report given to mother/baby ALMA DELIA Eller.

## 2024-11-19 VITALS
DIASTOLIC BLOOD PRESSURE: 75 MMHG | RESPIRATION RATE: 16 BRPM | TEMPERATURE: 98 F | HEART RATE: 70 BPM | BODY MASS INDEX: 26.75 KG/M2 | SYSTOLIC BLOOD PRESSURE: 106 MMHG | OXYGEN SATURATION: 98 % | HEIGHT: 63 IN | WEIGHT: 151 LBS

## 2024-11-19 PROBLEM — O47.9 UTERINE CONTRACTIONS (HCC): Status: RESOLVED | Noted: 2024-11-18 | Resolved: 2024-11-19

## 2024-11-19 PROBLEM — Z34.90 PREGNANCY (HCC): Status: RESOLVED | Noted: 2024-11-18 | Resolved: 2024-11-19

## 2024-11-19 LAB
BASOPHILS # BLD AUTO: 0.03 X10(3) UL (ref 0–0.2)
BASOPHILS NFR BLD AUTO: 0.5 %
EOSINOPHIL # BLD AUTO: 0.07 X10(3) UL (ref 0–0.7)
EOSINOPHIL NFR BLD AUTO: 1.2 %
ERYTHROCYTE [DISTWIDTH] IN BLOOD BY AUTOMATED COUNT: 13 %
HCT VFR BLD AUTO: 34.6 %
HGB BLD-MCNC: 11.8 G/DL
IMM GRANULOCYTES # BLD AUTO: 0.02 X10(3) UL (ref 0–1)
IMM GRANULOCYTES NFR BLD: 0.4 %
LYMPHOCYTES # BLD AUTO: 2.34 X10(3) UL (ref 1–4)
LYMPHOCYTES NFR BLD AUTO: 41 %
MCH RBC QN AUTO: 32.2 PG (ref 26–34)
MCHC RBC AUTO-ENTMCNC: 34.1 G/DL (ref 31–37)
MCV RBC AUTO: 94.3 FL
MONOCYTES # BLD AUTO: 0.29 X10(3) UL (ref 0.1–1)
MONOCYTES NFR BLD AUTO: 5.1 %
NEUTROPHILS # BLD AUTO: 2.96 X10 (3) UL (ref 1.5–7.7)
NEUTROPHILS # BLD AUTO: 2.96 X10(3) UL (ref 1.5–7.7)
NEUTROPHILS NFR BLD AUTO: 51.8 %
PLATELET # BLD AUTO: 184 10(3)UL (ref 150–450)
RBC # BLD AUTO: 3.67 X10(6)UL
WBC # BLD AUTO: 5.7 X10(3) UL (ref 4–11)

## 2024-11-19 NOTE — PROGRESS NOTES
Labor Analgesia Follow Up Note    Patient underwent epidural anesthesia for labor analgesia,    Placenta Date/Time: 11/18/2024  6:03 AM    Delivery Date/Time:: 11/18/2024  6:01 AM    BP 97/79 (BP Location: Right arm)   Pulse 69   Temp 97.9 °F (36.6 °C) (Oral)   Resp 16   Ht 1.6 m (5' 3\")   Wt 68.5 kg (151 lb)   LMP 02/22/2024   SpO2 98%   Breastfeeding Yes   BMI 26.75 kg/m²     Assessment:  Patient seen and no apparent anesthesia related complications.    Thank you for asking us to participate in the care of your patient.

## 2024-11-19 NOTE — PROGRESS NOTES
PPD#1    Pt has no complaints, lochia minimal. Tolerating PO, voiding without difficulty.    Vitals:    24 0934   BP: 106/75   Pulse: 70   Resp: 16   Temp: 98.2 °F (36.8 °C)     Recent Labs   Lab 24  0135 24  0728   RBC 4.34 3.67*   HGB 13.9 11.8*   HCT 39.6 34.6*   MCV 91.2 94.3   MCH 32.0 32.2   MCHC 35.1 34.1   RDW 12.7 13.0   NEPRELIM 3.93 2.96   WBC 7.0 5.7   .0 184.0         Gen: NAD, appears well  Uterus: firm, below umbilicus    34 year old  now PPD#1 s/p  early on AM  - AFVSS  - doing well  - Rh pos  - pt interested in discharge today. Discussed discharge instructions with pt and plan for follow up in 6 weeks

## 2024-11-19 NOTE — DISCHARGE INSTRUCTIONS
For pain: take tylenol 1000 mg every 6 hours as needed, ibuprofen 600 mg every 6 hours as needed    For constipation: take colace (docusate) twice daily as needed. Can also take Miralax or Milk of Magnesia nightly as needed (over the counter)    Nothing in the vagina for 6 weeks.     Call office for fever 100.4 or higher, increased vaginal bleeding soaking greater than 1 pad per hour, increased abdominal/pelvic pain, shortness of breath, chest pain, leg pain or swelling, persistent or severe headache, vision changes, persistent or severe upper abdominal pain, feeling depressed or extremely anxious, thoughts of self harm or harming infant(s).    Call office to schedule postpartum visit in 6 weeks.   Please call with other questions or concerns.

## 2024-11-19 NOTE — PROGRESS NOTES
Discharge patient home as order. Teaching complete, patient feel comfortable in taking care of herself and  infant. Hugs off. Send both mom and infant to their family car @ [phone].

## 2024-11-21 ENCOUNTER — TELEPHONE (OUTPATIENT)
Dept: OBGYN UNIT | Facility: HOSPITAL | Age: 34
End: 2024-11-21

## 2025-01-02 ENCOUNTER — POSTPARTUM (OUTPATIENT)
Facility: CLINIC | Age: 35
End: 2025-01-02
Payer: COMMERCIAL

## 2025-01-02 VITALS
HEIGHT: 63 IN | WEIGHT: 137.19 LBS | HEART RATE: 73 BPM | DIASTOLIC BLOOD PRESSURE: 60 MMHG | SYSTOLIC BLOOD PRESSURE: 110 MMHG | BODY MASS INDEX: 24.31 KG/M2

## 2025-01-02 PROCEDURE — 3078F DIAST BP <80 MM HG: CPT | Performed by: OBSTETRICS & GYNECOLOGY

## 2025-01-02 PROCEDURE — 3008F BODY MASS INDEX DOCD: CPT | Performed by: OBSTETRICS & GYNECOLOGY

## 2025-01-02 PROCEDURE — 3074F SYST BP LT 130 MM HG: CPT | Performed by: OBSTETRICS & GYNECOLOGY

## 2025-01-02 NOTE — PROGRESS NOTES
HPI    Kristyn Casas is a 34 year old female  here for 6 week post-partum visit.  Patient delivered a  female infant on 24 via .  Patient desires vasectomy for contraception.  Patient is bottle feeding.   Patient denies symptoms of depression, [place]  depression scale score of 0 out of 30.     [place]  DEPRESSION SCALE  I have been able to laugh and see the funny side of things: As much as I always could  I have looked forward with enjoyment to things: As much as I ever did  I have been anxious or worried for no good reason: No, not at all  I have felt scared or panicky for no good reason: No, not at all  Things have been getting on top of me: No, I have been coping as well as ever  I have been so unhappy that I have had difficulty sleeping: Not at all  I have felt sad or miserable: No, not at all  I have been so unhappy that I have been crying: No, never  The thought of harming myself has occurred to me: Never  Total: 0    OBSTETRIC HISTORY  OB History    Para Term  AB Living   3 2 2   1 2   SAB IAB Ectopic Multiple Live Births         0 2      # Outcome Date GA Lbr Lb/2nd Weight Sex Type Anes PTL Lv   3 Term 24 38w4d 06:28 / 00:33 7 lb 1.6 oz (3.22 kg) F NORMAL SPONT EPI N CATHERINE      Complications: Other - see comments   2 Term 23 39w1d 04:30 / 02:46 7 lb 1.9 oz (3.23 kg) M NORMAL SPONT EPI N CATHERINE      Complications: Oligohydramnios   1 AB 2019              Obstetric Comments   23 - IOL at 39w1d due to oligohydramnios. H/o COVID-19 during that pregnancy. Anxiety. Fhx congenital heart disease in multiple relatives of the father of the baby.          FHX: Maternal uncle with congenital syndrome and Down Syndrome, FOB sister with possible patent foramen ovale, FOB brother with ? VSD & he had a heart attack at 16 -? Aneurysm of cardiac vessel. FOB sister with a pacemaker (? Reason?). Aunt with hypertrophic cardiomyopathy.       GYNE  HISTORY        MEDICATIONS:    Current Outpatient Medications:     Prenatal Vit-Fe Fumarate-FA (PRENATAL VITAMIN PLUS LOW IRON) 27-1 MG Oral Tab, Take 1 tablet by mouth daily., Disp: , Rfl:     Docusate Sodium (COLACE OR), Take by mouth. (Patient not taking: Reported on 11/7/2024), Disp: , Rfl:     clobetasol 0.05 % External Ointment, Apply 1 Application topically 2 (two) times daily. (Patient not taking: Reported on 1/2/2025), Disp: , Rfl:     ALLERGIES:  Allergies[1]    PHYSICAL EXAM  Blood pressure 110/60, pulse 73, height 63\", weight 137 lb 3.2 oz (62.2 kg), last menstrual period 02/22/2024, not currently breastfeeding.  General:  Well nourished, well developed woman in no acute distress  Abdomen:  soft, nontender, no masses  External Genitalia: normal appearance, hair distribution, and no lesions  Vagina:  Normal appearance without lesions, no abnormal discharge  Cervix:  Normal without tenderness on motion  Uterus: normal in size, contour, position, mobility, without tenderness  Adnexa: normal without masses or tenderness  Perineum: well healed perineum  Anus: no hemorroids       ASSESSMENT/PLAN  Kristyn was seen today for postpartum care.    Diagnoses and all orders for this visit:    Postpartum exam (HCC)      Discussed all options of birthcontrol including ocps, minipill, Mirena or Paragard IUD, nuvaring, orthoevra patch, nexplanon, Depoprovera, condoms or tubal sterilization options. Patient has chosen vasectomy.  Patient to return for annual gyne exam in June.       [1] No Known Allergies

## 2025-08-14 ENCOUNTER — OFFICE VISIT (OUTPATIENT)
Facility: CLINIC | Age: 35
End: 2025-08-14

## 2025-08-14 VITALS
HEIGHT: 63 IN | SYSTOLIC BLOOD PRESSURE: 112 MMHG | BODY MASS INDEX: 21.79 KG/M2 | DIASTOLIC BLOOD PRESSURE: 66 MMHG | WEIGHT: 123 LBS | HEART RATE: 78 BPM

## 2025-08-14 DIAGNOSIS — Z12.4 PAPANICOLAOU SMEAR FOR CERVICAL CANCER SCREENING: Primary | ICD-10-CM

## 2025-08-14 PROCEDURE — 3078F DIAST BP <80 MM HG: CPT | Performed by: OBSTETRICS & GYNECOLOGY

## 2025-08-14 PROCEDURE — 3074F SYST BP LT 130 MM HG: CPT | Performed by: OBSTETRICS & GYNECOLOGY

## 2025-08-14 PROCEDURE — 99395 PREV VISIT EST AGE 18-39: CPT | Performed by: OBSTETRICS & GYNECOLOGY

## 2025-08-14 PROCEDURE — 87624 HPV HI-RISK TYP POOLED RSLT: CPT | Performed by: OBSTETRICS & GYNECOLOGY

## 2025-08-14 PROCEDURE — 3008F BODY MASS INDEX DOCD: CPT | Performed by: OBSTETRICS & GYNECOLOGY

## 2025-08-14 RX ORDER — DROSPIRENONE AND ETHINYL ESTRADIOL 0.02-3(28)
1 KIT ORAL DAILY
Qty: 28 TABLET | Refills: 12 | Status: SHIPPED | OUTPATIENT
Start: 2025-08-14 | End: 2026-08-14

## 2025-08-18 LAB — HPV E6+E7 MRNA CVX QL NAA+PROBE: NEGATIVE

## 2025-08-19 ENCOUNTER — RESULTS FOLLOW-UP (OUTPATIENT)
Facility: CLINIC | Age: 35
End: 2025-08-19

## 2025-08-19 LAB — LAST PAP RESULT: NORMAL
